# Patient Record
Sex: MALE | Race: WHITE | NOT HISPANIC OR LATINO | Employment: FULL TIME | URBAN - METROPOLITAN AREA
[De-identification: names, ages, dates, MRNs, and addresses within clinical notes are randomized per-mention and may not be internally consistent; named-entity substitution may affect disease eponyms.]

---

## 2017-03-24 RX ORDER — BACLOFEN 10 MG/1
TABLET ORAL
Qty: 90 TABLET | Refills: 0 | Status: SHIPPED | OUTPATIENT
Start: 2017-03-24 | End: 2017-04-25 | Stop reason: SDUPTHER

## 2017-03-24 RX ORDER — GABAPENTIN 600 MG/1
600 TABLET ORAL 4 TIMES DAILY
Qty: 120 TABLET | Refills: 3 | Status: SHIPPED | OUTPATIENT
Start: 2017-03-24 | End: 2017-07-12 | Stop reason: SDUPTHER

## 2017-04-25 RX ORDER — BACLOFEN 10 MG/1
TABLET ORAL
Qty: 90 TABLET | Refills: 0 | Status: SHIPPED | OUTPATIENT
Start: 2017-04-25 | End: 2017-05-26 | Stop reason: SDUPTHER

## 2017-05-30 RX ORDER — BACLOFEN 10 MG/1
TABLET ORAL
Qty: 90 TABLET | Refills: 0 | Status: SHIPPED | OUTPATIENT
Start: 2017-05-30 | End: 2017-06-29 | Stop reason: SDUPTHER

## 2017-06-29 RX ORDER — BACLOFEN 10 MG/1
TABLET ORAL
Qty: 90 TABLET | Refills: 0 | Status: SHIPPED | OUTPATIENT
Start: 2017-06-29 | End: 2017-07-29 | Stop reason: SDUPTHER

## 2017-07-12 RX ORDER — GABAPENTIN 600 MG/1
600 TABLET ORAL 4 TIMES DAILY
Qty: 120 TABLET | Refills: 3 | OUTPATIENT
Start: 2017-07-12 | End: 2018-01-05 | Stop reason: SDUPTHER

## 2017-07-31 RX ORDER — BACLOFEN 10 MG/1
TABLET ORAL
Qty: 90 TABLET | Refills: 0 | Status: SHIPPED | OUTPATIENT
Start: 2017-07-31 | End: 2017-09-01 | Stop reason: SDUPTHER

## 2017-08-07 ENCOUNTER — TELEPHONE (OUTPATIENT)
Dept: PAIN MEDICINE | Facility: CLINIC | Age: 57
End: 2017-08-07

## 2017-08-07 DIAGNOSIS — G50.0 TRIGEMINAL NEURALGIA: Primary | ICD-10-CM

## 2017-08-07 RX ORDER — OXCARBAZEPINE 150 MG/1
TABLET, FILM COATED ORAL
Qty: 120 TABLET | Refills: 3 | Status: SHIPPED | OUTPATIENT
Start: 2017-08-07 | End: 2017-12-11 | Stop reason: SDUPTHER

## 2017-08-07 NOTE — TELEPHONE ENCOUNTER
Patient states the Pharmacy tried to call today to get a refill on his oxcarbazepine that he takes for trigeminal neuralgia.

## 2017-09-01 RX ORDER — BACLOFEN 10 MG/1
TABLET ORAL
Qty: 90 TABLET | Refills: 0 | Status: SHIPPED | OUTPATIENT
Start: 2017-09-01 | End: 2017-10-04 | Stop reason: SDUPTHER

## 2017-10-05 RX ORDER — BACLOFEN 10 MG/1
TABLET ORAL
Qty: 90 TABLET | Refills: 0 | Status: SHIPPED | OUTPATIENT
Start: 2017-10-05 | End: 2018-10-17 | Stop reason: SDUPTHER

## 2017-10-12 RX ORDER — BACLOFEN 10 MG/1
TABLET ORAL
Qty: 90 TABLET | Refills: 0 | Status: SHIPPED | OUTPATIENT
Start: 2017-10-12 | End: 2017-12-04 | Stop reason: SDUPTHER

## 2017-12-04 RX ORDER — BACLOFEN 10 MG/1
TABLET ORAL
Qty: 90 TABLET | Refills: 0 | Status: SHIPPED | OUTPATIENT
Start: 2017-12-04 | End: 2018-01-05 | Stop reason: SDUPTHER

## 2017-12-11 DIAGNOSIS — G50.0 TRIGEMINAL NEURALGIA: ICD-10-CM

## 2017-12-11 RX ORDER — OXCARBAZEPINE 150 MG/1
TABLET, FILM COATED ORAL
Qty: 120 TABLET | Refills: 3 | Status: SHIPPED | OUTPATIENT
Start: 2017-12-11 | End: 2018-04-10 | Stop reason: SDUPTHER

## 2018-01-08 RX ORDER — BACLOFEN 10 MG/1
TABLET ORAL
Qty: 90 TABLET | Refills: 5 | Status: SHIPPED | OUTPATIENT
Start: 2018-01-08 | End: 2018-07-13 | Stop reason: SDUPTHER

## 2018-01-08 RX ORDER — GABAPENTIN 600 MG/1
TABLET ORAL
Qty: 120 TABLET | Refills: 5 | OUTPATIENT
Start: 2018-01-08 | End: 2018-07-13 | Stop reason: SDUPTHER

## 2018-04-10 DIAGNOSIS — G50.0 TRIGEMINAL NEURALGIA: ICD-10-CM

## 2018-04-10 RX ORDER — OXCARBAZEPINE 150 MG/1
TABLET, FILM COATED ORAL
Qty: 120 TABLET | Refills: 3 | Status: SHIPPED | OUTPATIENT
Start: 2018-04-10 | End: 2018-07-16 | Stop reason: SDUPTHER

## 2018-07-13 RX ORDER — GABAPENTIN 600 MG/1
TABLET ORAL
Qty: 120 TABLET | Refills: 5 | Status: SHIPPED | OUTPATIENT
Start: 2018-07-13 | End: 2018-10-17 | Stop reason: SDUPTHER

## 2018-07-13 RX ORDER — BACLOFEN 10 MG/1
TABLET ORAL
Qty: 90 TABLET | Refills: 5 | Status: SHIPPED | OUTPATIENT
Start: 2018-07-13 | End: 2018-10-17 | Stop reason: SDUPTHER

## 2018-07-13 NOTE — TELEPHONE ENCOUNTER
Patient called requesting refill on Gabapentin and Baclofen. Last seen 5/3/16. [facility] report# [id number] scanned into media     Per Dr. Nieto: Refill both x 1 and ask patient to get medications from PCP in future due to not being seen in more than 2 years.

## 2018-07-14 DIAGNOSIS — G50.0 TRIGEMINAL NEURALGIA: ICD-10-CM

## 2018-07-16 DIAGNOSIS — G50.0 TRIGEMINAL NEURALGIA: ICD-10-CM

## 2018-07-16 RX ORDER — OXCARBAZEPINE 150 MG/1
TABLET, FILM COATED ORAL
Qty: 120 TABLET | Refills: 3 | OUTPATIENT
Start: 2018-07-16

## 2018-07-16 RX ORDER — OXCARBAZEPINE 150 MG/1
300 TABLET, FILM COATED ORAL 2 TIMES DAILY
Qty: 120 TABLET | Refills: 0 | Status: SHIPPED | OUTPATIENT
Start: 2018-07-16 | End: 2018-09-12 | Stop reason: SDUPTHER

## 2018-07-16 RX ORDER — BACLOFEN 10 MG/1
TABLET ORAL
Qty: 90 TABLET | Refills: 5 | OUTPATIENT
Start: 2018-07-16

## 2018-07-17 RX ORDER — BACLOFEN 10 MG/1
TABLET ORAL
Qty: 90 TABLET | Refills: 5 | OUTPATIENT
Start: 2018-07-17

## 2018-09-12 DIAGNOSIS — G50.0 TRIGEMINAL NEURALGIA: ICD-10-CM

## 2018-09-12 RX ORDER — OXCARBAZEPINE 150 MG/1
TABLET, FILM COATED ORAL
Qty: 120 TABLET | Refills: 0 | Status: SHIPPED | OUTPATIENT
Start: 2018-09-12 | End: 2018-10-15 | Stop reason: SDUPTHER

## 2018-10-15 ENCOUNTER — TELEPHONE (OUTPATIENT)
Dept: PAIN MEDICINE | Facility: CLINIC | Age: 58
End: 2018-10-15

## 2018-10-15 DIAGNOSIS — G50.0 TRIGEMINAL NEURALGIA: ICD-10-CM

## 2018-10-15 RX ORDER — OXCARBAZEPINE 150 MG/1
TABLET, FILM COATED ORAL
Qty: 120 TABLET | Refills: 0 | Status: SHIPPED | OUTPATIENT
Start: 2018-10-15 | End: 2018-10-17 | Stop reason: SDUPTHER

## 2018-10-15 NOTE — TELEPHONE ENCOUNTER
----- Message from Mark Nieto MD sent at 10/15/2018  1:18 PM EDT -----  Regarding: RE: Rx  Please refill so he can have his med  ----- Message -----  From: Karlene Toscano MA  Sent: 10/15/2018  12:07 PM  To: Mark Nieto MD  Subject: Rx                                               Patient called requesting refill on Oxcarbazepine. He hasn't been seen since 5/4/16. He is scheduled for med refill with Armando on Wednesday however he is out of medication after today. He wants to know if it can be sent in prior to Wednesday. [facility] report # [id number] scanned to media

## 2018-10-16 NOTE — PROGRESS NOTES
"Chief Complaint: \"I'm here for a medication refill.  I'm doing alright.\"    History of Present Illness:   Patient: Mr. Maynor Villagomez, 57 y.o. male, who presents today for medication refill.  He takes 300 mg of oxcarbazepine twice daily for trigeminal neuralgia.  Also takes 10 mg of Baclofen tid, and 600 mg of gabapentin four times daily for trigeminal neuralgia.  He states that he is doing well with all medications, and denies any adverse effects.  Patient was last seen in this office on 05/24/2016 for trigeminal nerve RFTC and reports that he is still experiencing significant ongoing relief.  Patient voices no concerns today.      Global Pain Scale 10-          Pain 5          Feelings 2          Clinical outcomes 1          Activities 0          GPS Total: 8            I have reviewed [facility] Report #[id number], consistent to medication reconciliation    Review of Diagnostic Studies:  There are no new diagnostic studies available for review.    Review of Systems   All other systems reviewed and are negative.        Patient Active Problem List   Diagnosis   • Right trigeminal neuralgia   • Spinal stenosis in cervical region   • Bilateral carpal tunnel syndrome   • Moderate obesity       Past Medical History:   Diagnosis Date   • Depression    • Generalized erosion of teeth    • History of alcohol abuse    • History of carpal tunnel syndrome    • History of hepatitis    • History of hypotension    • History of nicotine dependence    • History of trigeminal neuralgia          Past Surgical History:   Procedure Laterality Date   • ARM WOUND REPAIR / CLOSURE Right    • MOUTH SURGERY      Tooth Extraction         Family History   Problem Relation Age of Onset   • Diabetes type II Mother    • Heart disease Father          Social History     Social History   • Marital status:      Social History Main Topics   • Smoking status: Current Every Day Smoker   • Alcohol use No   • Drug use: No     Other Topics Concern " "  • Not on file           Current Outpatient Prescriptions:   •  amLODIPine (NORVASC) 5 MG tablet, Take  by mouth., Disp: , Rfl:   •  B Complex Vitamins (VITAMIN B COMPLEX PO), Take  by mouth., Disp: , Rfl:   •  Cholecalciferol (VITAMIN D3) 2000 UNITS capsule, Take 1 capsule by mouth 2 (two) times a day., Disp: , Rfl:   •  lisinopril-hydrochlorothiazide (PRINZIDE,ZESTORETIC) 10-12.5 MG per tablet, Take 1 tablet by mouth daily., Disp: , Rfl:   •  Multiple Vitamin tablet, Take 1 tablet by mouth daily., Disp: , Rfl:   •  baclofen (LIORESAL) 10 MG tablet, Take 1 tablet by mouth 3 (Three) Times a Day., Disp: 90 tablet, Rfl: 5  •  gabapentin (NEURONTIN) 600 MG tablet, Take 1 tablet by mouth 4 (Four) Times a Day., Disp: 120 tablet, Rfl: 5  •  OXcarbazepine (TRILEPTAL) 150 MG tablet, Take 2 tablets by mouth 2 (Two) Times a Day., Disp: 120 tablet, Rfl: 5      No Known Allergies      /82   Pulse 88   Temp 97.3 °F (36.3 °C) (Temporal Artery )   Resp 18   Ht 177.8 cm (70\")   Wt 96 kg (211 lb 9.6 oz)   SpO2 93%   BMI 30.36 kg/m²      Physical Exam:  Constitutional: Patient is oriented to person, place, and time.  Patient appears well-developed and well-nourished.   HEENT: Head: Normocephalic and atraumatic. Eyes: Conjunctivae and lids are normal. Pupils: Equal, round, reactive to light.   Neck: Trachea normal. Neck supple.   Pulmonary Respiratory effort: No increased work of breathing or signs of respiratory distress. Auscultation of lungs: Clear to auscultation.   Cardiovascular Auscultation of heart: Normal rate and rhythm, normal S1 and S2, no murmurs.   Musculoskeletal   Gait and station: Gait evaluation demonstrated a normal gait  Cervical spine: Passive and active range of motion is full and without pain. Extension, flexion, lateral flexion, rotation of the cervical spine did not increase or reproduce pain. Cervical facet joint loading maneuvers are negative.   Neurological: Patient is alert and oriented to " person, place, and time. Speech: speech is normal. Cortical function: Normal mental status.   Cranial nerves: Cranial nerves 2-12 grossly intact.   Reflex Scores:  Right brachioradialis: 2+  Left brachioradialis: 2+  Right biceps: 2+  Left biceps: 2+  Right triceps: 2+  Left triceps: 2+  Right patellar: 2+  Left patellar: 2+  Right achilles: 2+  Left achilles: 2+  Motor strength: 5/5  Motor Tone: normal tone.   Involuntary movements: none.   Right ankle clonus: absent  Left ankle clonus: absent   No nuchal rigidity. Negative Kernig and Brudzinski.  Spurling sign is negative.   Sensation: No sensory loss. Sensory exam: intact to light touch, intact pain and temperature sensation, intact vibration sensation and normal proprioception.   Coordination: Normal finger to nose and heel to shin. Normal balance and negative. Romberg's sign negative.   Skin and subcutaneous tissue: Skin is warm and intact. No rash noted. No cyanosis.   Psychiatric: Judgment and insight: Normal. Orientation to person, place and time: Normal. Recent and remote memory: Intact. Mood and affect: Normal.     ASSESSMENT:   1. Right trigeminal neuralgia    2. Spinal stenosis in cervical region    3. Bilateral carpal tunnel syndrome      PLAN/MEDICAL DECISION MAKING:  I have reviewed all available medical records as well as previous therapies as referenced above, and discussed the patient with Dr. Nieto.  1. Continue gabapentin 600 mg: Take 1 tab po qid.  Refilled.  Patient was advised that he will need a medication follow-up in 6 months.  2. Continue baclofen 10 mg: Take 1 tab po tid.  Refilled.    3. Continue oxcarbazepine 150 mg: Take 2 tabs po bid.  Refilled.  4. The patient has been instructed to contact my office with any questions or difficulties. The patient understands the plan and agrees to proceed accordingly.     Patient Care Team:  Case Borden MD as PCP - General (Family Medicine)  Mark Nieto MD as Consulting Physician (Pain  Medicine)     No orders of the defined types were placed in this encounter.        No future appointments.      Armando Haque PA-C     EMR Dragon/Transcription disclaimer:  Much of this encounter note is an electronic transcription of spoken language to printed text. Electronic transcription of spoken language may permit erroneous, or at times, nonsensical words or phrases to be inadvertently transcribed. Although I have reviewed the note for such errors, some may still exist.

## 2018-10-17 ENCOUNTER — OFFICE VISIT (OUTPATIENT)
Dept: PAIN MEDICINE | Facility: CLINIC | Age: 58
End: 2018-10-17

## 2018-10-17 VITALS
OXYGEN SATURATION: 93 % | DIASTOLIC BLOOD PRESSURE: 82 MMHG | HEART RATE: 88 BPM | RESPIRATION RATE: 18 BRPM | SYSTOLIC BLOOD PRESSURE: 122 MMHG | BODY MASS INDEX: 30.29 KG/M2 | HEIGHT: 70 IN | TEMPERATURE: 97.3 F | WEIGHT: 211.6 LBS

## 2018-10-17 DIAGNOSIS — G50.0 RIGHT TRIGEMINAL NEURALGIA: Primary | ICD-10-CM

## 2018-10-17 DIAGNOSIS — G50.0 TRIGEMINAL NEURALGIA: ICD-10-CM

## 2018-10-17 DIAGNOSIS — M48.02 SPINAL STENOSIS IN CERVICAL REGION: ICD-10-CM

## 2018-10-17 DIAGNOSIS — G56.03 BILATERAL CARPAL TUNNEL SYNDROME: ICD-10-CM

## 2018-10-17 PROCEDURE — 99213 OFFICE O/P EST LOW 20 MIN: CPT | Performed by: PHYSICIAN ASSISTANT

## 2018-10-17 RX ORDER — BACLOFEN 10 MG/1
10 TABLET ORAL 3 TIMES DAILY
Qty: 90 TABLET | Refills: 5 | Status: SHIPPED | OUTPATIENT
Start: 2018-10-17 | End: 2019-05-30 | Stop reason: SDUPTHER

## 2018-10-17 RX ORDER — AMLODIPINE BESYLATE 5 MG/1
TABLET ORAL
COMMUNITY
Start: 2018-10-15 | End: 2022-04-22 | Stop reason: SDUPTHER

## 2018-10-17 RX ORDER — GABAPENTIN 600 MG/1
600 TABLET ORAL 4 TIMES DAILY
Qty: 120 TABLET | Refills: 5 | OUTPATIENT
Start: 2018-10-17 | End: 2019-05-20 | Stop reason: SDUPTHER

## 2018-10-17 RX ORDER — OXCARBAZEPINE 150 MG/1
300 TABLET, FILM COATED ORAL 2 TIMES DAILY
Qty: 120 TABLET | Refills: 5 | Status: SHIPPED | OUTPATIENT
Start: 2018-10-17 | End: 2019-05-13 | Stop reason: SDUPTHER

## 2019-05-09 DIAGNOSIS — G50.0 TRIGEMINAL NEURALGIA: ICD-10-CM

## 2019-05-10 RX ORDER — OXCARBAZEPINE 150 MG/1
TABLET, FILM COATED ORAL
Qty: 340 TABLET | Refills: 2 | OUTPATIENT
Start: 2019-05-10

## 2019-05-13 DIAGNOSIS — G50.0 TRIGEMINAL NEURALGIA: ICD-10-CM

## 2019-05-13 RX ORDER — OXCARBAZEPINE 150 MG/1
300 TABLET, FILM COATED ORAL 2 TIMES DAILY
Qty: 120 TABLET | Refills: 0 | Status: SHIPPED | OUTPATIENT
Start: 2019-05-13 | End: 2019-05-30 | Stop reason: SDUPTHER

## 2019-05-13 NOTE — TELEPHONE ENCOUNTER
Patient called requesting refill on Oxcarbazepine. Patient last seen 10/17/2018. Called patient to schedule appointment for med refill however he can't come in for 2 weeks. Sent 30 day supply to [facility] and scheduled patient an appointment on 05/30/19.

## 2019-05-20 RX ORDER — GABAPENTIN 600 MG/1
TABLET ORAL
Qty: 120 TABLET | Refills: 5 | OUTPATIENT
Start: 2019-05-20

## 2019-05-20 RX ORDER — GABAPENTIN 600 MG/1
600 TABLET ORAL 4 TIMES DAILY
Qty: 40 TABLET | Refills: 0 | OUTPATIENT
Start: 2019-05-20 | End: 2019-05-30 | Stop reason: SDUPTHER

## 2019-05-20 NOTE — TELEPHONE ENCOUNTER
Rx called in to [facility]    ----- Message from Billie Dickerson RN sent at 5/20/2019  4:08 PM EDT -----      ----- Message -----  From: Mark Nieto MD  Sent: 5/20/2019   4:05 PM  To: Billie Dickerson RN    Sure. Please call some until he sees Armando  ----- Message -----  From: Billie Dickerson RN  Sent: 5/20/2019   4:03 PM  To: Mark Nieto MD    Patient called about a gabapentin refill. He is scheduled for an OV 5/30.     He is requesting a refill. He last refilled 02/19/2019- Pharmacy confirmed that fill.     He does admit to forgetting to take the medication sometimes. Ordered 600mg QID     Do you want to call any in for him until the the 30th?

## 2019-05-29 NOTE — PROGRESS NOTES
"Chief Complaint: \"Medication refill.\"    History of Present Illness:   Patient: Mr. Maynor Villagomez, 58 y.o. male, who presents today for medication refill.  He takes 300 mg of oxcarbazepine twice daily, 10 mg of Baclofen tid, and 600 mg of gabapentin four times daily for trigeminal neuralgia.  Patient states that he is doing well with the medications, and denies any adverse effects.  Pain level today is 2/10.  Patient was previously seen on 05/24/2016 for trigeminal nerve RFTC and reports that he is still experiencing significant ongoing relief.        Global Pain Scale 10-17-18 05-30-19         Pain 5 6         Feelings 2 2         Clinical outcomes 1 1         Activities 0 0         GPS Total: 8 9           I have reviewed [facility] Report #[id number], consistent to medication reconciliation    Review of Diagnostic Studies:  There are no new diagnostic studies available for review.    Review of Systems   All other systems reviewed and are negative.        Patient Active Problem List   Diagnosis   • Right trigeminal neuralgia   • Spinal stenosis in cervical region   • Bilateral carpal tunnel syndrome   • Moderate obesity       Past Medical History:   Diagnosis Date   • Depression    • Generalized erosion of teeth    • History of alcohol abuse    • History of carpal tunnel syndrome    • History of hepatitis    • History of hypotension    • History of nicotine dependence    • History of trigeminal neuralgia          Past Surgical History:   Procedure Laterality Date   • ARM WOUND REPAIR / CLOSURE Right    • MOUTH SURGERY      Tooth Extraction         Family History   Problem Relation Age of Onset   • Diabetes type II Mother    • Heart disease Father          Social History     Socioeconomic History   • Marital status:      Spouse name: Not on file   • Number of children: Not on file   • Years of education: Not on file   • Highest education level: Not on file   Tobacco Use   • Smoking status: Current Every Day Smoker " "  Substance and Sexual Activity   • Alcohol use: No   • Drug use: No           Current Outpatient Medications:   •  amLODIPine (NORVASC) 5 MG tablet, Take  by mouth., Disp: , Rfl:   •  B Complex Vitamins (VITAMIN B COMPLEX PO), Take  by mouth., Disp: , Rfl:   •  Cholecalciferol (VITAMIN D3) 2000 UNITS capsule, Take 1 capsule by mouth 2 (two) times a day., Disp: , Rfl:   •  gabapentin (NEURONTIN) 600 MG tablet, Take 1 tablet by mouth 4 (Four) Times a Day., Disp: 40 tablet, Rfl: 0  •  lisinopril-hydrochlorothiazide (PRINZIDE,ZESTORETIC) 10-12.5 MG per tablet, Take 1 tablet by mouth daily., Disp: , Rfl:   •  Multiple Vitamin tablet, Take 1 tablet by mouth daily., Disp: , Rfl:   •  varenicline (CHANTIX CONTINUING MONTH VAL) 1 MG tablet, Take 1 tablet by mouth Every 12 (Twelve) Hours., Disp: , Rfl:   •  baclofen (LIORESAL) 10 MG tablet, Take 1 tablet by mouth 3 (Three) Times a Day., Disp: 90 tablet, Rfl: 6  •  OXcarbazepine (TRILEPTAL) 150 MG tablet, Take 2 tablets by mouth 2 (Two) Times a Day., Disp: 120 tablet, Rfl: 6      No Known Allergies      /76 (BP Location: Left arm, Patient Position: Sitting)   Pulse 84   Temp 98.1 °F (36.7 °C) (Temporal)   Ht 177.8 cm (70\")   Wt 90.7 kg (200 lb)   SpO2 93%   BMI 28.70 kg/m²      Physical Exam:  Constitutional: Patient is oriented to person, place, and time.  Appears well-developed and well-nourished.   Head: Normocephalic and atraumatic. Eyes: Conjunctivae and lids are normal. Pupils: Equal, round, and reactive to light.   Neck: Trachea normal. Neck supple.   Pulmonary: No increased work of breathing or signs of respiratory distress.  Clear to auscultation bilaterally.   Cardiovascular: Normal rate and rhythm, normal S1 and S2, no murmurs.   Musculoskeletal   Gait and station: Normal  Cervical spine: Passive and active range of motion is full and without pain. Extension, flexion, lateral flexion, rotation of the cervical spine did not increase or reproduce pain. " Cervical facet joint loading maneuvers are negative.   Neurological: Patient is alert and oriented to person, place, and time. Speech: speech is normal. Cortical function: Normal mental status.   Cranial nerves: Cranial nerves 2-12 grossly intact.   Reflex Scores:  brachioradialis: 2+ bilaterally  biceps: 2+ bilaterally  triceps: 2+ bilaterally  patellar: 2+ bilaterally  Achilles: 2+ bilaterally  Motor strength: 5/5  Motor Tone: normal tone.   Involuntary movements: none.   Right ankle clonus: absent  Left ankle clonus: absent   No nuchal rigidity. Negative Kernig and Brudzinski.  Spurling sign is negative.   Sensation: No sensory loss. Sensory exam: intact to light touch, intact pain and temperature sensation, intact vibration sensation and normal proprioception.   Coordination: Normal finger to nose and heel to shin. Normal balance and negative. Romberg's sign negative.   Skin and subcutaneous tissue: Skin is warm and intact. No rash noted. No cyanosis.   Psychiatric: Judgment and insight: Normal. Orientation to person, place and time: Normal. Recent and remote memory: Intact. Mood and affect: Normal.     ASSESSMENT:   1. Right trigeminal neuralgia    2. Spinal stenosis in cervical region    3. Bilateral carpal tunnel syndrome      PLAN/MEDICAL DECISION MAKING:  I have reviewed all available medical records as well as previous therapies as referenced above, and discussed the patient with Dr. Nieto.  1. Continue gabapentin 600 mg: Take 1 tab po qid.  Refilled.    2. Continue baclofen 10 mg: Take 1 tab po tid.  Refilled.    3. Continue oxcarbazepine 150 mg: Take 2 tabs po bid.  Refilled.  4. The patient has been instructed to contact my office with any questions or difficulties. The patient understands the plan and agrees to proceed accordingly.     Patient Care Team:  Case Borden MD as PCP - General (Family Medicine)  Mark Nieto MD as Consulting Physician (Pain Medicine)     No orders of the defined  types were placed in this encounter.        Future Appointments   Date Time Provider Department Center   11/25/2019  3:00 PM Armando Haque PA-C [facility] None         Armando Haque PA-C     EMR Dragon/Transcription disclaimer:  Much of this encounter note is an electronic transcription of spoken language to printed text. Electronic transcription of spoken language may permit erroneous, or at times, nonsensical words or phrases to be inadvertently transcribed. Although I have reviewed the note for such errors, some may still exist.

## 2019-05-30 ENCOUNTER — OFFICE VISIT (OUTPATIENT)
Dept: PAIN MEDICINE | Facility: CLINIC | Age: 59
End: 2019-05-30

## 2019-05-30 VITALS
OXYGEN SATURATION: 93 % | DIASTOLIC BLOOD PRESSURE: 76 MMHG | SYSTOLIC BLOOD PRESSURE: 124 MMHG | TEMPERATURE: 98.1 F | HEIGHT: 70 IN | WEIGHT: 200 LBS | HEART RATE: 84 BPM | BODY MASS INDEX: 28.63 KG/M2

## 2019-05-30 DIAGNOSIS — G50.0 RIGHT TRIGEMINAL NEURALGIA: Primary | ICD-10-CM

## 2019-05-30 DIAGNOSIS — G56.03 BILATERAL CARPAL TUNNEL SYNDROME: ICD-10-CM

## 2019-05-30 DIAGNOSIS — G50.0 TRIGEMINAL NEURALGIA: ICD-10-CM

## 2019-05-30 DIAGNOSIS — M48.02 SPINAL STENOSIS IN CERVICAL REGION: ICD-10-CM

## 2019-05-30 PROCEDURE — 99213 OFFICE O/P EST LOW 20 MIN: CPT | Performed by: PHYSICIAN ASSISTANT

## 2019-05-30 RX ORDER — VARENICLINE TARTRATE 1 MG/1
1 TABLET, FILM COATED ORAL EVERY 12 HOURS
COMMUNITY
Start: 2019-05-09 | End: 2019-12-12

## 2019-05-30 RX ORDER — GABAPENTIN 600 MG/1
600 TABLET ORAL 4 TIMES DAILY
Qty: 120 TABLET | Refills: 5 | OUTPATIENT
Start: 2019-05-30 | End: 2019-12-12 | Stop reason: SDUPTHER

## 2019-05-30 RX ORDER — GABAPENTIN 600 MG/1
600 TABLET ORAL 4 TIMES DAILY
Qty: 120 TABLET | Refills: 6 | Status: CANCELLED | OUTPATIENT
Start: 2019-05-30

## 2019-05-30 RX ORDER — OXCARBAZEPINE 150 MG/1
300 TABLET, FILM COATED ORAL 2 TIMES DAILY
Qty: 120 TABLET | Refills: 6 | Status: SHIPPED | OUTPATIENT
Start: 2019-05-30 | End: 2019-12-12 | Stop reason: SDUPTHER

## 2019-05-30 RX ORDER — BACLOFEN 10 MG/1
10 TABLET ORAL 3 TIMES DAILY
Qty: 90 TABLET | Refills: 6 | Status: SHIPPED | OUTPATIENT
Start: 2019-05-30 | End: 2019-12-12 | Stop reason: SDUPTHER

## 2019-12-11 NOTE — PROGRESS NOTES
"Chief Complaint: \"I have done significantly well with my previous trigeminal nerve ablations, the pain is returning\"       History of Present Illness:   Patient: Mr. Maynor Villagomez, 59 y.o. male,  returns for evaluation of trigeminal neuralgia, patient underwent right trigeminal nerve radiofrequency ablation on May 4, 2016, from which he reports 3 1/2 years of 100% pain relief, the pain began returning 1 month ago.  Prior to 2016 he underwent a right trigeminal nerve radiofrequency ablation that provided him with 5 years of complete resolution of his trigeminal neuralgia pain.  He was last seen for follow-up evaluation and medication refill on May 30, 2019 by Armando Haque PA-C, and at the time of his follow-up visit he was still experiencing significant ongoing relief of his trigeminal neuralgia, from previous trigeminal nerve radiofrequency ablation in 2016.  He states the neck and upper extremity pain and numbness have resolved since his cervical epidural steroid injection. He does have severe trigeminal pain intermittently at this time.  He continues to take 300 mg of oxcarbazepine twice daily, 10 mg of Baclofen tid, and 600 mg of gabapentin four times daily for trigeminal neuralgia, he denies any adverse effects, he has remained on this medication for several years with significant results and relief of his trigeminal neuralgia. He reports no significant changes in his medical history since he was seen last seen.  Pain intensity today: 7/10  Average pain intensity: 7/10  Description of pain:  Location of pain: right-sided unilateral predominantly on the second and third division of the right trigeminal nerve  Radiation of pain :none  Character of pain:crushing, sharp, shooting, stabbing and throbbing  Severity of pain: 10/10 during his pain attacks, decreasing to 0/10 during his pain-free intervals.  Duration of pain attacks: From a few seconds up to a 10 seconds  Accompanying symptoms: None  Triggering factors: " Brushing his teeth, touching his face, talking, chewing, washing his face   Rapidity of onset: sudden  Are most pain episodes similar in presentation? yes  Started having trigeminal neuralgia 10 years ago  Worst time of day: All day long, particularly depending upon triggering factors   Awaken from sleep?: yes  Seasonal pattern?: no  Overall pattern since problem began: Pain resolved after right trigeminal nerve radiofrequency ablation for more than 5 years, and most recently more than 3 years.   Degree of Functional Impairment: severe  Current Use of Meds to Treat his trigeminal neuralgia: Gabapentin 600 mg 4 times a day, Trileptal 150 mg 2 tablets twice a day, baclofen 10 mg 3 times a day     Review of previous therapies and additional medical records:  Interventional measures: 05/04/2016: Right trigeminal nerve RFTC  03/30/2016: Cervical epidural steroid injection, C6-C7 interlaminar approach  06/15/2011: Right trigeminal nerve RFTC (experienced complete resolution for 5 years)  12/04/2013: Bilateral carpal tunnel injections  Mr. Maynor Villagomez, 59 y.o. male  presents with significant comorbidities including nicotine addiction,  not engaged in treatment., hypertension and hyperlipidemia engaged in treatment.  In terms of current analgesics,Mr. Maynor Villagomez, 59 y.o. male takes: gabapentin, Trileptal, Baclofen.  I have reviewed Willie Report #[id number] consistent to medication reconciliation.    Global Pain Scale 10-17-  2018 05-30-  2019 12-12-  2019        Pain 5 6 16        Feelings 2 2 0        Clinical outcomes 1 1 0        Activities 0 0 0        GPS Total: 8 9 16          Review of Diagnostic Studies:  There are no new diagnostic studies available for review.    Review of Systems   All other systems reviewed and are negative.        Patient Active Problem List   Diagnosis   • Right trigeminal neuralgia   • Spinal stenosis in cervical region   • Bilateral carpal tunnel syndrome   • Moderate obesity       Past  "Medical History:   Diagnosis Date   • Depression    • Generalized erosion of teeth    • History of alcohol abuse    • History of carpal tunnel syndrome    • History of hepatitis    • History of hypotension    • History of nicotine dependence    • History of trigeminal neuralgia          Past Surgical History:   Procedure Laterality Date   • ARM WOUND REPAIR / CLOSURE Right    • MOUTH SURGERY      Tooth Extraction         Family History   Problem Relation Age of Onset   • Diabetes type II Mother    • Heart disease Father          Social History     Socioeconomic History   • Marital status:      Spouse name: Not on file   • Number of children: Not on file   • Years of education: Not on file   • Highest education level: Not on file   Tobacco Use   • Smoking status: Current Every Day Smoker   Substance and Sexual Activity   • Alcohol use: No   • Drug use: No           Current Outpatient Medications:   •  amLODIPine (NORVASC) 5 MG tablet, Take  by mouth., Disp: , Rfl:   •  B Complex Vitamins (VITAMIN B COMPLEX PO), Take  by mouth., Disp: , Rfl:   •  baclofen (LIORESAL) 10 MG tablet, Take 1 tablet by mouth 3 (Three) Times a Day., Disp: 90 tablet, Rfl: 6  •  Cholecalciferol (VITAMIN D3) 2000 UNITS capsule, Take 1 capsule by mouth 2 (two) times a day., Disp: , Rfl:   •  gabapentin (NEURONTIN) 600 MG tablet, Take 1 tablet by mouth 4 (Four) Times a Day., Disp: 120 tablet, Rfl: 5  •  lisinopril-hydrochlorothiazide (PRINZIDE,ZESTORETIC) 10-12.5 MG per tablet, Take 1 tablet by mouth daily., Disp: , Rfl:   •  Multiple Vitamin tablet, Take 1 tablet by mouth daily., Disp: , Rfl:   •  OXcarbazepine (TRILEPTAL) 150 MG tablet, Take 2 tablets by mouth 2 (Two) Times a Day., Disp: 120 tablet, Rfl: 6      Allergies   Allergen Reactions   • Cephalexin Anaphylaxis   • Penicillins Anaphylaxis         /78   Pulse 96   Temp 97.8 °F (36.6 °C) (Temporal)   Resp 18   Ht 177.8 cm (70\")   Wt 94.7 kg (208 lb 12.8 oz)   SpO2 97%   " BMI 29.96 kg/m²      Physical Exam:  Constitutional: Patient is oriented to person, place, and time.   Patient appears well-developed and well-nourished.   HEENT: Head: Normocephalic and atraumatic.   Eyes: Conjunctivae and lids are normal.   Pupils: Equal, round, reactive to light.   TMJ: Mandibular opening slightly decreased. No grinding, popping, crepitus, or clicking was noted.  Palpation of the joint in the ear canal while the patient opens and closes the mandible reveals normal condylar movement without tenderness, but does elicit pain from trigeminal nerve on the right side.   Neck: Trachea normal. Neck supple. No JVD present.   Lymphatic: No cervical adenopathy  Pulmonary Respiratory effort: No increased work of breathing or signs of respiratory distress. Auscultation of lungs: Clear to auscultation.   Cardiovascular Auscultation of heart: Normal rate and rhythm, normal S1 and S2, no murmurs.   Peripheral vascular exam:  Femoral: right 2+, left 2+. Posterior tibialis: right 2+ and left 2+. Dorsalis pedis: right 2+ and left 2+. No edema.   Abdomen: The abdomen was soft and nontender. Bowel sounds were normal.   Lymphatic: Right: No inguinal adenopathy present. Left: No inguinal adenopathy present.    Musculoskeletal   Gait and station: Gait evaluation demonstrated a normal gait   Cervical spine:   Passive and active range of motion are full and without pain. Extension, flexion, lateral flexion, rotation of the cervical spine did not increase or reproduce pain. Cervical facet joint loading maneuvers are negative.   Muscles: Presence of active trigger points levator scapulae   Shoulders: The range of motion of the glenohumeral joints is full and without pain. Rotator cuff strength is 5/5.   Neurological:   Patient is alert and oriented to person, place, and time.   Speech: speech is normal.   Cortical function: Normal mental status.   Cranial nerves: Cranial nerves 2-12 intact.   Reflex Scores:  Right  brachioradialis: 2+  Left brachioradialis: 2+  Right biceps: 2+  Left biceps: 2+  Right triceps: 2+  Left triceps: 2+  Right patellar: 2+  Left patellar: 2+  Right Achilles: 2+  Left Achilles: 2+  Motor strength: 5/5  Motor Tone: normal tone.   Involuntary movements: none.   Superficial/Primitive Reflexes: primitive reflexes were absent.   Right Carrillo: absent  Left Carrillo: absent  Right ankle clonus: absent  Left ankle clonus: absent   Spurling sign is negative. Neck tornado test is negative. Lhermitte sign is negative. Negative long tract signs. Straight leg raising test is negative. Femoral stretch sign is negative.   Sensation: No sensory loss. Sensory exam: intact to light touch, intact pain and temperature sensation, intact vibration sensation and normal proprioception.   There is hyperesthesia, hyperalgesia and allodynia in the territory of the second and third division of the right trigeminal nerve   Coordination: Normal finger to nose and heel to shin. Normal balance and  negative Romberg's sign   Skin and subcutaneous tissue: Skin is warm and intact. No rash noted. No cyanosis.   Psychiatric: Judgment and insight: Normal. Orientation to person, place and time: Normal. Recent and remote memory: Intact. Mood and affect: Normal.       ASSESSMENT:   1. Right trigeminal neuralgia    2. Spinal stenosis in cervical region    3. Bilateral carpal tunnel syndrome    4. Moderate obesity    5. Trigeminal neuralgia    6. Current every day smoker    7. Tobacco abuse counseling           PLAN/MEDICAL DECISION MAKING: I had a lengthy conversation with Mr. Villagomez and his wife regarding his severe and recurrent right trigeminal neuralgia.  He continues to have significant pain relief in his trigeminal neuralgia with interventional pain management measures as referenced above, and medication therapy. Refills were provided as appropriate. I have reviewed all available medical records as well as previous therapies as  referenced above, and discussed the patient with Dr. Nieto. Therefore I have proposed the following plan:  1.  Pharmacological measures, as follows:   A. Continue gabapentin 600 mg: Take 1 tab po qid.     B. Continue baclofen 10 mg: Take 1 tab po tid.     C. Continue oxcarbazepine 150 mg: Take 2 tabs po bid.    D. Continue vitamin B complex B1, B2, B6, B12 and folic acid   2.  Interventional pain management measures: Patient will be scheduled for right trigeminal nerve radiofrequency ablation.  3.  Long-term rehabilitation efforts:  A. The patient does not have a history of falls. I did complete a risk assessment for falls.   B. Patient has declined a comprehensive physical therapy program   C. Patient has declined a referral to Dr. Rusty Valencia for cognitive behavioral therapy, biofeedback and individual therapy for smoking cessation.  D. Patient has been screened for tobacco use: Current tobacco user 2 PPD. Smoking Cessation: I have advised the patient at length regarding the long-term deleterious effects of smoking and have provided the patient lifestyle modification strategies to facilitate smoking cessation. For instance, I have instructed the patient to delay the time that he lights his first cigarette in the morning from 1 hour to 2 hours and to continue pushing back 30-60 minutes, if possible, every day for the rest of the week. We have also discussed pharmacological interventions in addition to participation in support groups, individual counseling, to name a few to facilitate smoking/nicotine cessation. I spent approximately 3 minutes advising the patient.  He has attempted to quit smoking with Chantix and nicotine patches, he has declined referral to a psychologist or other individual therapy programs to assist him to quit smoking.  He expresses no desire at this time to quit smoking.  4. The patient has been instructed to contact my office with any questions or difficulties. The patient understands  the plan and agrees to proceed accordingly.       Patient Care Team:  Case Borden MD as PCP - General (Family Medicine)  Mark Nieto MD as Consulting Physician (Pain Medicine)  Maynor Fregoso MD as Consulting Physician (Urology)     New Medications Ordered This Visit   Medications   • baclofen (LIORESAL) 10 MG tablet     Sig: Take 1 tablet by mouth 3 (Three) Times a Day.     Dispense:  90 tablet     Refill:  6     Please consider 90 day supplies to promote better adherence   • OXcarbazepine (TRILEPTAL) 150 MG tablet     Sig: Take 2 tablets by mouth 2 (Two) Times a Day.     Dispense:  120 tablet     Refill:  6     Please consider 90 day supplies to promote better adherence         Future Appointments   Date Time Provider Department Center   1/8/2020 11:30 AM Mark Nieto MD [facility] None         ALONSO Horvath     EMR Dragon/Transcription disclaimer:  Much of this encounter note is an electronic transcription of spoken language to printed text. Electronic transcription of spoken language may permit erroneous, or at times, nonsensical words or phrases to be inadvertently transcribed. Although I have reviewed the note for such errors, some may still exist.

## 2019-12-12 ENCOUNTER — OFFICE VISIT (OUTPATIENT)
Dept: PAIN MEDICINE | Facility: CLINIC | Age: 59
End: 2019-12-12

## 2019-12-12 VITALS
TEMPERATURE: 97.8 F | OXYGEN SATURATION: 97 % | DIASTOLIC BLOOD PRESSURE: 78 MMHG | WEIGHT: 208.8 LBS | SYSTOLIC BLOOD PRESSURE: 124 MMHG | BODY MASS INDEX: 29.89 KG/M2 | RESPIRATION RATE: 18 BRPM | HEIGHT: 70 IN | HEART RATE: 96 BPM

## 2019-12-12 DIAGNOSIS — Z71.6 TOBACCO ABUSE COUNSELING: ICD-10-CM

## 2019-12-12 DIAGNOSIS — E66.8 MODERATE OBESITY: ICD-10-CM

## 2019-12-12 DIAGNOSIS — M48.02 SPINAL STENOSIS IN CERVICAL REGION: ICD-10-CM

## 2019-12-12 DIAGNOSIS — G50.0 RIGHT TRIGEMINAL NEURALGIA: Primary | ICD-10-CM

## 2019-12-12 DIAGNOSIS — G50.0 TRIGEMINAL NEURALGIA: ICD-10-CM

## 2019-12-12 DIAGNOSIS — G56.03 BILATERAL CARPAL TUNNEL SYNDROME: ICD-10-CM

## 2019-12-12 DIAGNOSIS — G50.0 RIGHT TRIGEMINAL NEURALGIA: ICD-10-CM

## 2019-12-12 DIAGNOSIS — F17.200 CURRENT EVERY DAY SMOKER: ICD-10-CM

## 2019-12-12 PROCEDURE — 99214 OFFICE O/P EST MOD 30 MIN: CPT | Performed by: NURSE PRACTITIONER

## 2019-12-12 RX ORDER — BACLOFEN 10 MG/1
10 TABLET ORAL 3 TIMES DAILY
Qty: 90 TABLET | Refills: 6 | Status: SHIPPED | OUTPATIENT
Start: 2019-12-12 | End: 2020-07-02 | Stop reason: SDUPTHER

## 2019-12-12 RX ORDER — OXCARBAZEPINE 150 MG/1
300 TABLET, FILM COATED ORAL 2 TIMES DAILY
Qty: 120 TABLET | Refills: 6 | Status: SHIPPED | OUTPATIENT
Start: 2019-12-12 | End: 2020-07-02 | Stop reason: SDUPTHER

## 2019-12-12 RX ORDER — GABAPENTIN 600 MG/1
600 TABLET ORAL 4 TIMES DAILY
Qty: 120 TABLET | Refills: 5 | OUTPATIENT
Start: 2019-12-12 | End: 2020-07-06

## 2020-01-08 ENCOUNTER — OUTSIDE FACILITY SERVICE (OUTPATIENT)
Dept: PAIN MEDICINE | Facility: CLINIC | Age: 60
End: 2020-01-08

## 2020-01-08 PROCEDURE — 77002 NEEDLE LOCALIZATION BY XRAY: CPT | Performed by: ANESTHESIOLOGY

## 2020-01-08 PROCEDURE — 99152 MOD SED SAME PHYS/QHP 5/>YRS: CPT | Performed by: ANESTHESIOLOGY

## 2020-01-08 PROCEDURE — 64605 INJECTION TREATMENT OF NERVE: CPT | Performed by: ANESTHESIOLOGY

## 2020-01-09 ENCOUNTER — TELEPHONE (OUTPATIENT)
Dept: PAIN MEDICINE | Facility: CLINIC | Age: 60
End: 2020-01-09

## 2020-01-09 NOTE — TELEPHONE ENCOUNTER
"Spoke with patient he reports that he is \"doing good\" and has \"high spirits\".   Patient reports functional improvement to include eating.   Reminded patient of appointment and appointment card sent   "

## 2020-07-02 DIAGNOSIS — G50.0 TRIGEMINAL NEURALGIA: ICD-10-CM

## 2020-07-02 DIAGNOSIS — G50.0 RIGHT TRIGEMINAL NEURALGIA: Primary | ICD-10-CM

## 2020-07-02 NOTE — TELEPHONE ENCOUNTER
Request # : [id number]    01/23/2020 Gabapentin 600MG 1960 120 30 [place] [facility] 10-  0720  [facility] 3  02/23/2020 [facility]apentin 600MG 1960 120 [facility] [place] [facility] 10-  0720  [facility] 3  03/26/2020 Gabapentin 600MG 1960 120 30 [facility],[place] [facility] 10-  0720  [facility] 3  04/27/2020 [facility]ap[facility] 600MG 1960 120 [facility] 10-  0720  [facility] 3  06/02/2020 Gabapentin 600MG 1960 120 30 [facility],[place] [facility] 10-  0720  [facility] 3

## 2020-07-06 RX ORDER — GABAPENTIN 600 MG/1
TABLET ORAL
Qty: 120 TABLET | Refills: 0 | Status: SHIPPED | OUTPATIENT
Start: 2020-07-06 | End: 2020-07-08 | Stop reason: SDUPTHER

## 2020-07-06 RX ORDER — BACLOFEN 10 MG/1
10 TABLET ORAL 3 TIMES DAILY
Qty: 90 TABLET | Refills: 0 | Status: SHIPPED | OUTPATIENT
Start: 2020-07-06 | End: 2020-07-08 | Stop reason: SDUPTHER

## 2020-07-06 RX ORDER — OXCARBAZEPINE 150 MG/1
300 TABLET, FILM COATED ORAL 2 TIMES DAILY
Qty: 120 TABLET | Refills: 0 | Status: SHIPPED | OUTPATIENT
Start: 2020-07-06 | End: 2020-07-08 | Stop reason: SDUPTHER

## 2020-07-06 NOTE — PROGRESS NOTES
"Chief Complaint: \"I am doing fairly well since my trigeminal ablation. I have good days and bad days.\"       History of Present Illness:   Patient: Mr. Maynor Villagomez, 59 y.o. male,  returns for evaluation of trigeminal neuralgia, patient underwent right trigeminal nerve radiofrequency ablation on January 8, 2020, from which he reports experiencing 60% pain relief and functional improvement that is ongoing. He previously underwent right trigeminal nerve radiofrequency ablation on May 4, 2016 that provided him with 3 1/2 years of 100% pain relief. He states the neck and upper extremity pain and numbness have resolved since his cervical epidural steroid injection. He does have intermittent trigeminal pain at this time, that can be severe.  He continues to take 300 mg of oxcarbazepine twice daily, 10 mg of Baclofen TID, and 600 mg of gabapentin four times daily for trigeminal neuralgia, he denies any adverse effects, he has remained on this medication for several years with significant results and relief of his trigeminal neuralgia. Although, he does reports at times he will miss a dose of his gabapentin due to forgetfulness. He reports no significant changes in his medical history since he was seen last seen.  Pain intensity today: 4/10  Average pain intensity: 4/10  Description of pain:  Location of pain: right-sided unilateral predominantly on the second and third division of the right trigeminal nerve  Radiation of pain :none  Character of pain:crushing, sharp, shooting, stabbing and throbbing  Severity of pain: 10/10 during his pain attacks, decreasing to 0/10 during his pain-free intervals.  Duration of pain attacks: From a few seconds up to a 10 seconds  Accompanying symptoms: None  Triggering factors: Brushing his teeth, touching his face, talking, chewing, washing his face   Rapidity of onset: sudden  Are most pain episodes similar in presentation? yes  Started having trigeminal neuralgia 10 years ago  Worst " time of day: All day long, particularly depending upon triggering factors   Awaken from sleep?: yes  Seasonal pattern?: no  Overall pattern since problem began: Pain resolved after right trigeminal nerve radiofrequency ablation for more than 5 years, and most recently more than 3 years.   Degree of Functional Impairment: severe  Current Use of Meds to Treat his trigeminal neuralgia: Gabapentin 600 mg 4 times a day, Trileptal 150 mg 2 tablets twice a day, baclofen 10 mg 3 times a day       Review of previous therapies and additional medical records:  Interventional measures: 01/08/2020: Right trigeminal nerve RFTC  05/04/2016: Right trigeminal nerve RFTC  03/30/2016: Cervical epidural steroid injection, C6-C7 interlaminar approach  06/15/2011: Right trigeminal nerve RFTC (experienced complete resolution for 5 years)  12/04/2013: Bilateral carpal tunnel injections  Mr. Maynor Villagomez, 59 y.o. male  presents with significant comorbidities including nicotine addiction,  not engaged in treatment., hypertension and hyperlipidemia engaged in treatment.  In terms of current analgesics,Mr. Maynor Villagomez, 59 y.o. male takes: gabapentin, Trileptal, Baclofen.  I have reviewed Willie Report #[id number] consistent to medication reconciliation.    Global Pain Scale 10-17-  2018 05-30-  2019 12-12-  2019 07-08  2020       Pain 5 6 16 8       Feelings 2 2 0 0       Clinical outcomes 1 1 0 0       Activities 0 0 0 0       GPS Total: 8 9 16 8         Review of Diagnostic Studies:  There are no new diagnostic studies available for review.    Review of Systems   All other systems reviewed and are negative.        Patient Active Problem List   Diagnosis   • Right trigeminal neuralgia   • Spinal stenosis in cervical region   • Bilateral carpal tunnel syndrome   • Moderate obesity       Past Medical History:   Diagnosis Date   • Depression    • Generalized erosion of teeth    • History of alcohol abuse    • History of carpal tunnel syndrome   "  • History of hepatitis    • History of hypotension    • History of nicotine dependence    • History of trigeminal neuralgia          Past Surgical History:   Procedure Laterality Date   • ARM WOUND REPAIR / CLOSURE Right    • MOUTH SURGERY      Tooth Extraction         Family History   Problem Relation Age of Onset   • Diabetes type II Mother    • Heart disease Father          Social History     Socioeconomic History   • Marital status:      Spouse name: Not on file   • Number of children: Not on file   • Years of education: Not on file   • Highest education level: Not on file   Tobacco Use   • Smoking status: Current Every Day Smoker   Substance and Sexual Activity   • Alcohol use: No   • Drug use: No           Current Outpatient Medications:   •  albuterol sulfate HFA (ProAir HFA) 108 (90 Base) MCG/ACT inhaler, inhale 2 puff by inhalation route  every 4 - 6 hours as needed, Disp: , Rfl:   •  amLODIPine (NORVASC) 5 MG tablet, Take  by mouth., Disp: , Rfl:   •  B Complex Vitamins (VITAMIN B COMPLEX PO), Take  by mouth., Disp: , Rfl:   •  baclofen (LIORESAL) 10 MG tablet, Take 1 tablet by mouth 3 (Three) Times a Day., Disp: 90 tablet, Rfl: 5  •  Cholecalciferol (VITAMIN D3) 2000 UNITS capsule, Take 1 capsule by mouth 2 (two) times a day., Disp: , Rfl:   •  gabapentin (NEURONTIN) 600 MG tablet, Take one tablet BID, two tablets QHS, Disp: 120 tablet, Rfl: 1  •  lisinopril-hydrochlorothiazide (PRINZIDE,ZESTORETIC) 10-12.5 MG per tablet, Take 1 tablet by mouth daily., Disp: , Rfl:   •  Multiple Vitamin tablet, Take 1 tablet by mouth daily., Disp: , Rfl:   •  OXcarbazepine (TRILEPTAL) 150 MG tablet, Take 2 tablets by mouth 2 (Two) Times a Day., Disp: 120 tablet, Rfl: 5      Allergies   Allergen Reactions   • Cephalexin Anaphylaxis   • Penicillins Anaphylaxis         /70 (BP Location: Left arm, Patient Position: Sitting)   Pulse 74   Temp 98.7 °F (37.1 °C)   Resp 16   Ht 177.8 cm (70\")   Wt 90.5 kg (199 " lb 8 oz)   SpO2 96%   BMI 28.63 kg/m²      Physical Exam:  Constitutional: Patient is oriented to person, place, and time.   Patient appears well-developed and well-nourished.   HEENT: Head: Normocephalic and atraumatic.   Eyes: Conjunctivae and lids are normal.   Pupils: Equal, round, reactive to light.   TMJ: Mandibular opening slightly decreased. No grinding, popping, crepitus, or clicking was noted. Palpation of the joint in the ear canal while the patient opens and closes the mandible reveals normal condylar movement without tenderness, but does elicit pain from trigeminal nerve on the right side.   Neck: Trachea normal. Neck supple. No JVD present.   Lymphatic: No cervical adenopathy  Pulmonary Respiratory effort: No increased work of breathing or signs of respiratory distress. Auscultation of lungs: Clear to auscultation.   Cardiovascular Auscultation of heart: Normal rate and rhythm, normal S1 and S2, no murmurs.   Peripheral vascular exam:  Femoral: right 2+, left 2+. Posterior tibialis: right 2+ and left 2+. Dorsalis pedis: right 2+ and left 2+. No edema.   Abdomen: The abdomen was soft and nontender. Bowel sounds were normal.   Lymphatic: Right: No inguinal adenopathy present. Left: No inguinal adenopathy present.    Musculoskeletal   Gait and station: Gait evaluation demonstrated a normal gait   Cervical spine: Passive and active range of motion are full and without pain. Extension, flexion, lateral flexion, rotation of the cervical spine did not increase or reproduce pain. Cervical facet joint loading maneuvers are negative.   Muscles: Presence of active trigger points levator scapulae   Shoulders: The range of motion of the glenohumeral joints is full and without pain. Rotator cuff strength is 5/5.   Neurological:   Patient is alert and oriented to person, place, and time.   Speech: speech is normal.   Cortical function: Normal mental status.   Cranial nerves: Cranial nerves 2-12 intact.   Reflex  Scores:  Right brachioradialis: 2+  Left brachioradialis: 2+  Right biceps: 2+  Left biceps: 2+  Right triceps: 2+  Left triceps: 2+  Right patellar: 2+  Left patellar: 2+  Right Achilles: 2+  Left Achilles: 2+  Motor strength: 5/5  Motor Tone: normal tone.   Involuntary movements: none.   Superficial/Primitive Reflexes: primitive reflexes were absent.   Right Carrillo: absent  Left Carrillo: absent  Right ankle clonus: absent  Left ankle clonus: absent   Spurling sign is negative. Neck tornado test is negative. Lhermitte sign is negative. Negative long tract signs. Straight leg raising test is negative. Femoral stretch sign is negative.   Sensation: No sensory loss. Sensory exam: intact to light touch, intact pain and temperature sensation, intact vibration sensation and normal proprioception.   There is hyperesthesia, hyperalgesia and allodynia in the territory of the second and third division of the right trigeminal nerve   Coordination: Normal finger to nose and heel to shin. Normal balance and  negative Romberg's sign   Skin and subcutaneous tissue: Skin is warm and intact. No rash noted. No cyanosis.   Psychiatric: Judgment and insight: Normal. Orientation to person, place and time: Normal. Recent and remote memory: Intact. Mood and affect: Normal.       ASSESSMENT:   1. Right trigeminal neuralgia    2. Spinal stenosis in cervical region    3. Bilateral carpal tunnel syndrome    4. Moderate obesity    5. Trigeminal neuralgia           PLAN/MEDICAL DECISION MAKING: I had a lengthy conversation with Mr. Villagomez regarding his severe and recurrent right trigeminal neuralgia.  He continues to have significant pain relief in his trigeminal neuralgia with interventional pain management measures as referenced above, and medication therapy. He continues to take 300 mg of oxcarbazepine twice daily, 10 mg of Baclofen TID, and 600 mg of gabapentin four times daily for trigeminal neuralgia, he denies any adverse effects, he  has remained on this medication for several years with significant results and relief of his trigeminal neuralgia. Although, he does reports at times he will miss a dose of his gabapentin due to forgetfulness. He will use his current prescription and begin 600 mg BID along with tow tablets=1200 mg QHS. He will report to me in about a month or so to let me know how he is doing.  Refills were provided as appropriate. I have reviewed all available medical records as well as previous therapies as referenced above. Therefore I have proposed the following plan:  1.  Pharmacological measures, as follows:   A. Trial gabapentin 600 mg BID, two tablets-1200 mg QHS.     B. Continue baclofen 10 mg: Take 1 tab po tid.     C. Continue oxcarbazepine 150 mg: Take 2 tabs po bid.    D. Continue vitamin B complex B1, B2, B6, B12 and folic acid   2. Follow up in 6 months or sooner if needed.   3.  Interventional pain management measures: None indicated at this time. If pain recurs, we could repeat right trigeminal nerve radiofrequency ablation.  4.  Long-term rehabilitation efforts:  A. The patient does not have a history of falls. I did complete a risk assessment for falls.   B. Patient has declined a comprehensive physical therapy program   C. Patient has declined a referral to Dr. Rusty Valencia for cognitive behavioral therapy, biofeedback and individual therapy for smoking cessation.  D. Patient has been screened for tobacco use: Current tobacco user 2 PPD. Smoking Cessation: I have advised the patient at length regarding the long-term deleterious effects of smoking and have provided the patient lifestyle modification strategies to facilitate smoking cessation. For instance, I have instructed the patient to delay the time that he lights his first cigarette in the morning from 1 hour to 2 hours and to continue pushing back 30-60 minutes, if possible, every day for the rest of the week. We have also discussed pharmacological  interventions in addition to participation in support groups, individual counseling, to name a few to facilitate smoking/nicotine cessation. I spent approximately 3 minutes advising the patient.  He has attempted to quit smoking with Chantix and nicotine patches, he has declined referral to a psychologist or other individual therapy programs to assist him to quit smoking.  He expresses no desire at this time to quit smoking.  5. The patient has been instructed to contact my office with any questions or difficulties. The patient understands the plan and agrees to proceed accordingly.       Patient Care Team:  Case Borden MD as PCP - General (Family Medicine)  Mark Nieto MD as Consulting Physician (Pain Medicine)  Maynor Fregoso MD as Consulting Physician (Urology)     New Medications Ordered This Visit   Medications   • baclofen (LIORESAL) 10 MG tablet     Sig: Take 1 tablet by mouth 3 (Three) Times a Day.     Dispense:  90 tablet     Refill:  5     Please consider 90 day supplies to promote better adherence   • OXcarbazepine (TRILEPTAL) 150 MG tablet     Sig: Take 2 tablets by mouth 2 (Two) Times a Day.     Dispense:  120 tablet     Refill:  5     Please consider 90 day supplies to promote better adherence   • gabapentin (NEURONTIN) 600 MG tablet     Sig: Take one tablet BID, two tablets QHS     Dispense:  120 tablet     Refill:  1         Future Appointments   Date Time Provider Department Center   1/5/2021  8:00 AM Lyly Rich APRN [facility] None         ALONSO Horvath     EMR Dragon/Transcription disclaimer:  Much of this encounter note is an electronic transcription of spoken language to printed text. Electronic transcription of spoken language may permit erroneous, or at times, nonsensical words or phrases to be inadvertently transcribed. Although I have reviewed the note for such errors, some may still exist.

## 2020-07-08 ENCOUNTER — OFFICE VISIT (OUTPATIENT)
Dept: PAIN MEDICINE | Facility: CLINIC | Age: 60
End: 2020-07-08

## 2020-07-08 VITALS
SYSTOLIC BLOOD PRESSURE: 112 MMHG | TEMPERATURE: 98.7 F | RESPIRATION RATE: 16 BRPM | HEART RATE: 74 BPM | DIASTOLIC BLOOD PRESSURE: 70 MMHG | BODY MASS INDEX: 28.56 KG/M2 | OXYGEN SATURATION: 96 % | WEIGHT: 199.5 LBS | HEIGHT: 70 IN

## 2020-07-08 DIAGNOSIS — M48.02 SPINAL STENOSIS IN CERVICAL REGION: ICD-10-CM

## 2020-07-08 DIAGNOSIS — G56.03 BILATERAL CARPAL TUNNEL SYNDROME: ICD-10-CM

## 2020-07-08 DIAGNOSIS — G50.0 TRIGEMINAL NEURALGIA: ICD-10-CM

## 2020-07-08 DIAGNOSIS — E66.8 MODERATE OBESITY: ICD-10-CM

## 2020-07-08 DIAGNOSIS — G50.0 RIGHT TRIGEMINAL NEURALGIA: ICD-10-CM

## 2020-07-08 PROCEDURE — 99213 OFFICE O/P EST LOW 20 MIN: CPT | Performed by: NURSE PRACTITIONER

## 2020-07-08 RX ORDER — BACLOFEN 10 MG/1
10 TABLET ORAL 3 TIMES DAILY
Qty: 90 TABLET | Refills: 5 | Status: SHIPPED | OUTPATIENT
Start: 2020-07-08 | End: 2020-12-10

## 2020-07-08 RX ORDER — ALBUTEROL SULFATE 90 UG/1
AEROSOL, METERED RESPIRATORY (INHALATION)
COMMUNITY
Start: 2019-04-09 | End: 2022-04-22

## 2020-07-08 RX ORDER — OXCARBAZEPINE 150 MG/1
300 TABLET, FILM COATED ORAL 2 TIMES DAILY
Qty: 120 TABLET | Refills: 5 | Status: SHIPPED | OUTPATIENT
Start: 2020-07-08

## 2020-07-08 RX ORDER — GABAPENTIN 600 MG/1
TABLET ORAL
Qty: 120 TABLET | Refills: 1 | Status: SHIPPED | OUTPATIENT
Start: 2020-07-08 | End: 2020-08-03

## 2020-08-03 DIAGNOSIS — G50.0 RIGHT TRIGEMINAL NEURALGIA: Primary | ICD-10-CM

## 2020-08-03 RX ORDER — GABAPENTIN 800 MG/1
800 TABLET ORAL 4 TIMES DAILY
Qty: 120 TABLET | Refills: 3 | Status: SHIPPED | OUTPATIENT
Start: 2020-08-03 | End: 2020-12-10

## 2020-12-10 DIAGNOSIS — G50.0 RIGHT TRIGEMINAL NEURALGIA: ICD-10-CM

## 2020-12-10 RX ORDER — GABAPENTIN 800 MG/1
TABLET ORAL
Qty: 120 TABLET | Refills: 0 | Status: SHIPPED | OUTPATIENT
Start: 2020-12-10 | End: 2021-01-05 | Stop reason: SDUPTHER

## 2020-12-10 RX ORDER — BACLOFEN 10 MG/1
TABLET ORAL
Qty: 270 TABLET | Refills: 0 | Status: SHIPPED | OUTPATIENT
Start: 2020-12-10

## 2021-01-02 NOTE — PROGRESS NOTES
"Chief Complaint: \"I am doing very well. I had cyberknife treatment.\"       History of Present Illness:   Patient: Mr. Maynor Villagomez, 60 y.o. male,  returns for evaluation of trigeminal neuralgia and medication refills. At the time of his last office visit with me on July 8, 2020 he was experiencing about 60% pain relief from right trigeminal nerve ablation performed on January 8, 2020. At that point he was complaining of intermittent trigeminal pain, that can be severe at times, he tells me in September 2020 his pain became significantly severe and he did was unable to work.  He reports after doing some research he found treatment at the [facility], and underwent CyberKnife treatment for trigeminal neuralgia in October 2020 by Dr. Tanner Barrett.  He reports remarkable response to this treatment and he has currently weaned off Trileptal, baclofen, and has reduced his gabapentin to 800 mg twice daily.  He reports he is able to perform activities such as brushing his teeth touching his face, chewing and use electronic razor which she has not done in 10 years without pain or shocking to the area of his trigeminal nerve.   Pain intensity today: 0/10  Average pain intensity: 2/10  Description of pain:  Location of pain: right-sided unilateral predominantly on the second and third division of the right trigeminal nerve  Radiation of pain : none  Character of pain: Previously crushing, sharp, shooting, stabbing and throbbing  Severity of pain: He has not experienced a severe attack in 3 months  Duration of pain attacks: He has not experienced a severe attack in 3 months  Accompanying symptoms: None  Triggering factors: None at this time  Rapidity of onset: sudden  Are most pain episodes similar in presentation? yes  Started having trigeminal neuralgia 10 years ago  Worst time of day: N/A   Awaken from sleep?: no  Seasonal pattern?: no  Overall pattern since problem began: Pain resolved " after right trigeminal nerve radiofrequency ablation for more than 5 years, and most recently more than 3 years and now since trigeminal cyberknife  Degree of Functional Impairment: N/A  Current Use of Meds to Treat his trigeminal neuralgia: Gabapentin 800 mg 2 times a day. He has DC'd Trileptal and baclofen        Review of previous therapies and additional medical records:  Interventional measures: 01/08/2020: Right trigeminal nerve RFTC  05/04/2016: Right trigeminal nerve RFTC  03/30/2016: Cervical epidural steroid injection, C6-C7 interlaminar approach  06/15/2011: Right trigeminal nerve RFTC (experienced complete resolution for 5 years)  12/04/2013: Bilateral carpal tunnel injections  CyberKnife treatment for trigeminal neuralgia in October 2020 by Dr. Tanner Barrett at [facility].  Mr. Maynor Villagomez, 60 y.o. male  presents with significant comorbidities including nicotine addiction,  not engaged in treatment., hypertension and hyperlipidemia engaged in treatment.  In terms of current analgesics,Mr. Maynor Villagomez, 60 y.o. male takes: gabapentin.  I have reviewed Willie Report #[id number] consistent to medication reconciliation.    Global Pain Scale 10-17-  2018 05-30-  2019 12-12-  2019 07-08  2020 01-05  2021      Pain 5 6 16 8 1      Feelings 2 2 0 0 0      Clinical outcomes 1 1 0 0 0      Activities 0 0 0 0 0      GPS Total: 8 9 16 8 1        Review of Diagnostic Studies:  There are no new diagnostic studies available for review.    Review of Systems   All other systems reviewed and are negative.        Patient Active Problem List   Diagnosis   • Right trigeminal neuralgia   • Spinal stenosis in cervical region   • Bilateral carpal tunnel syndrome   • Moderate obesity       Past Medical History:   Diagnosis Date   • Depression    • Generalized erosion of teeth    • History of alcohol abuse    • History of carpal tunnel syndrome    • History of hepatitis    • History of hypotension    •  "History of nicotine dependence    • History of trigeminal neuralgia          Past Surgical History:   Procedure Laterality Date   • ARM WOUND REPAIR / CLOSURE Right    • MOUTH SURGERY      Tooth Extraction         Family History   Problem Relation Age of Onset   • Diabetes type II Mother    • Heart disease Father          Social History     Socioeconomic History   • Marital status:      Spouse name: Not on file   • Number of children: Not on file   • Years of education: Not on file   • Highest education level: Not on file   Tobacco Use   • Smoking status: Current Every Day Smoker   Substance and Sexual Activity   • Alcohol use: No   • Drug use: No           Current Outpatient Medications:   •  albuterol sulfate HFA (ProAir HFA) 108 (90 Base) MCG/ACT inhaler, inhale 2 puff by inhalation route  every 4 - 6 hours as needed, Disp: , Rfl:   •  amLODIPine (NORVASC) 5 MG tablet, Take  by mouth., Disp: , Rfl:   •  B Complex Vitamins (VITAMIN B COMPLEX PO), Take  by mouth., Disp: , Rfl:   •  baclofen (LIORESAL) 10 MG tablet, TAKE 1 TABLET BY MOUTH THREE TIMES DAILY, Disp: 270 tablet, Rfl: 0  •  Cholecalciferol (VITAMIN D3) 2000 UNITS capsule, Take 1 capsule by mouth 2 (two) times a day., Disp: , Rfl:   •  gabapentin (NEURONTIN) 800 MG tablet, Take 1 tablet by mouth 4 times daily (Patient taking differently: 800 mg 2 (Two) Times a Day.), Disp: 120 tablet, Rfl: 0  •  lisinopril-hydrochlorothiazide (PRINZIDE,ZESTORETIC) 10-12.5 MG per tablet, Take 1 tablet by mouth daily., Disp: , Rfl:   •  Multiple Vitamin tablet, Take 1 tablet by mouth daily., Disp: , Rfl:   •  OXcarbazepine (TRILEPTAL) 150 MG tablet, Take 2 tablets by mouth 2 (Two) Times a Day., Disp: 120 tablet, Rfl: 5      Allergies   Allergen Reactions   • Cephalexin Anaphylaxis   • Penicillins Anaphylaxis         /92   Pulse 91   Temp 98.1 °F (36.7 °C)   Ht 177.8 cm (70\")   Wt 90.4 kg (199 lb 6.4 oz)   SpO2 97%   BMI 28.61 kg/m²      Physical " Exam:  Constitutional: Patient is oriented to person, place, and time.   Patient appears well-developed and well-nourished.   HEENT: Head: Normocephalic and atraumatic.   Eyes: Conjunctivae and lids are normal.   Pupils: Equal, round, reactive to light.   TMJ: Mandibular opening slightly decreased. No grinding, popping, crepitus, or clicking was noted. Palpation of the joint in the ear canal while the patient opens and closes the mandible reveals normal condylar movement without tenderness, but does elicit pain from trigeminal nerve on the right side.   Neck: Trachea normal. Neck supple. No JVD present.   Lymphatic: No cervical adenopathy  Pulmonary Respiratory effort: No increased work of breathing or signs of respiratory distress. Auscultation of lungs: Clear to auscultation.   Cardiovascular Auscultation of heart: Normal rate and rhythm, normal S1 and S2, no murmurs.   Peripheral vascular exam:  Femoral: right 2+, left 2+. Posterior tibialis: right 2+ and left 2+. Dorsalis pedis: right 2+ and left 2+. No edema.   Abdomen: The abdomen was soft and nontender. Bowel sounds were normal.   Lymphatic: Right: No inguinal adenopathy present. Left: No inguinal adenopathy present.    Musculoskeletal   Gait and station: Gait evaluation demonstrated a normal gait   Cervical spine: Passive and active range of motion are full and without pain. Extension, flexion, lateral flexion, rotation of the cervical spine did not increase or reproduce pain. Cervical facet joint loading maneuvers are negative.   Muscles: Presence of active trigger points levator scapulae   Shoulders: The range of motion of the glenohumeral joints is full and without pain. Rotator cuff strength is 5/5.   Neurological:   Patient is alert and oriented to person, place, and time.   Speech: speech is normal.   Cortical function: Normal mental status.   Cranial nerves: Cranial nerves 2-12 intact.   Reflex Scores:  Right brachioradialis: 2+  Left brachioradialis:  2+  Right biceps: 2+  Left biceps: 2+  Right triceps: 2+  Left triceps: 2+  Right patellar: 2+  Left patellar: 2+  Right Achilles: 2+  Left Achilles: 2+  Motor strength: 5/5  Motor Tone: normal tone.   Involuntary movements: none.   Superficial/Primitive Reflexes: primitive reflexes were absent.   Right Carrillo: absent  Left Carrillo: absent  Right ankle clonus: absent  Left ankle clonus: absent   Spurling sign is negative. Neck tornado test is negative. Lhermitte sign is negative. Negative long tract signs. Straight leg raising test is negative. Femoral stretch sign is negative.   Sensation: No sensory loss. Sensory exam: intact to light touch, intact pain and temperature sensation, intact vibration sensation and normal proprioception.   There is still some minimal numbness in the area of the second and third division of the right trigeminal nerve, no hyperesthesia, hyperalgesia and allodynia   Coordination: Normal finger to nose and heel to shin. Normal balance and  negative Romberg's sign   Skin and subcutaneous tissue: Skin is warm and intact. No rash noted. No cyanosis.   Psychiatric: Judgment and insight: Normal. Orientation to person, place and time: Normal. Recent and remote memory: Intact. Mood and affect: Normal.       ASSESSMENT:   1. Right trigeminal neuralgia    2. Spinal stenosis in cervical region    3. Bilateral carpal tunnel syndrome    4. Current every day smoker           PLAN/MEDICAL DECISION MAKING: I had a lengthy conversation with Mr. Villagomez regarding his severe and recurrent right trigeminal neuralgia.  He continues to have marginal pain relief of his trigeminal neuralgia with interventional pain management measures as referenced above, and medication therapy. He tells me in September 2020 his pain became significantly severe and he did was unable to work.  He reports after doing some research he found treatment at the [facility], and underwent CyberKnife  treatment for trigeminal neuralgia in October 2020 by Dr. Tanner Barrett.  He reports remarkable response to this treatment and he has currently weaned off Trileptal, baclofen, and has reduced his gabapentin to 800 mg twice daily.  He will continue to wean off of his gabapentin as tolerated, he will keep me updated on his progress.  I have reviewed all available medical records as well as previous therapies as referenced above. Therefore I have proposed the following plan:  1.  Pharmacological measures, as follows:   A. Continue gabapentin 800 mg BID. #60. 2 refills.    D. Continue vitamin B complex B1, B2, B6, B12 and folic acid   2.  Follow up in 6 months.   3.  Long-term rehabilitation efforts:  A. The patient does not have a history of falls. I did complete a risk assessment for falls.   B. Patient has declined a comprehensive physical therapy program   C. Patient has declined a referral to Dr. Rusty Valencia for cognitive behavioral therapy, biofeedback and individual therapy for smoking cessation.  D. Patient has been screened for tobacco use: Current tobacco user 2 PPD. Smoking Cessation: I have advised the patient at length regarding the long-term deleterious effects of smoking and have provided the patient lifestyle modification strategies to facilitate smoking cessation. For instance, I have instructed the patient to delay the time that he lights his first cigarette in the morning from 1 hour to 2 hours and to continue pushing back 30-60 minutes, if possible, every day for the rest of the week. We have also discussed pharmacological interventions in addition to participation in support groups, individual counseling, to name a few to facilitate smoking/nicotine cessation. I spent approximately 3 minutes advising the patient.  He has attempted to quit smoking with Chantix and nicotine patches, he has declined referral to a psychologist or other individual therapy programs to assist him to quit smoking.  He  expresses no desire at this time to quit smoking.  4. The patient has been instructed to contact my office with any questions or difficulties. The patient understands the plan and agrees to proceed accordingly.       Patient Care Team:  Case Borden MD as PCP - General (Family Medicine)  Mark Nieto MD as Consulting Physician (Pain Medicine)  Maynor Fregoso MD as Consulting Physician (Urology)     No orders of the defined types were placed in this encounter.        No future appointments.      ALONSO Horvath     EMR Dragon/Transcription disclaimer:  Much of this encounter note is an electronic transcription of spoken language to printed text. Electronic transcription of spoken language may permit erroneous, or at times, nonsensical words or phrases to be inadvertently transcribed. Although I have reviewed the note for such errors, some may still exist.

## 2021-01-05 ENCOUNTER — OFFICE VISIT (OUTPATIENT)
Dept: PAIN MEDICINE | Facility: CLINIC | Age: 61
End: 2021-01-05

## 2021-01-05 VITALS
HEART RATE: 91 BPM | DIASTOLIC BLOOD PRESSURE: 92 MMHG | SYSTOLIC BLOOD PRESSURE: 147 MMHG | WEIGHT: 199.4 LBS | TEMPERATURE: 98.1 F | BODY MASS INDEX: 28.55 KG/M2 | OXYGEN SATURATION: 97 % | HEIGHT: 70 IN

## 2021-01-05 DIAGNOSIS — F17.200 CURRENT EVERY DAY SMOKER: ICD-10-CM

## 2021-01-05 DIAGNOSIS — M48.02 SPINAL STENOSIS IN CERVICAL REGION: ICD-10-CM

## 2021-01-05 DIAGNOSIS — G50.0 RIGHT TRIGEMINAL NEURALGIA: ICD-10-CM

## 2021-01-05 DIAGNOSIS — G56.03 BILATERAL CARPAL TUNNEL SYNDROME: ICD-10-CM

## 2021-01-05 PROCEDURE — 99213 OFFICE O/P EST LOW 20 MIN: CPT | Performed by: NURSE PRACTITIONER

## 2021-01-05 RX ORDER — GABAPENTIN 800 MG/1
800 TABLET ORAL 2 TIMES DAILY
Qty: 60 TABLET | Refills: 5 | Status: SHIPPED | OUTPATIENT
Start: 2021-01-05 | End: 2022-04-22

## 2022-04-22 ENCOUNTER — OFFICE VISIT (OUTPATIENT)
Dept: FAMILY MEDICINE CLINIC | Facility: CLINIC | Age: 62
End: 2022-04-22

## 2022-04-22 VITALS
OXYGEN SATURATION: 95 % | HEART RATE: 82 BPM | SYSTOLIC BLOOD PRESSURE: 122 MMHG | BODY MASS INDEX: 27.71 KG/M2 | TEMPERATURE: 97.7 F | WEIGHT: 197.9 LBS | HEIGHT: 71 IN | DIASTOLIC BLOOD PRESSURE: 78 MMHG | RESPIRATION RATE: 14 BRPM

## 2022-04-22 DIAGNOSIS — I10 HYPERTENSION, ESSENTIAL: ICD-10-CM

## 2022-04-22 DIAGNOSIS — R06.2 WHEEZING: ICD-10-CM

## 2022-04-22 DIAGNOSIS — Z72.0 TOBACCO USE: ICD-10-CM

## 2022-04-22 DIAGNOSIS — R73.9 HYPERGLYCEMIA: Primary | ICD-10-CM

## 2022-04-22 PROBLEM — A63.0 CONDYLOMA ACUMINATUM: Status: ACTIVE | Noted: 2019-10-02

## 2022-04-22 PROBLEM — M48.02 SPINAL STENOSIS OF CERVICAL REGION: Status: ACTIVE | Noted: 2022-04-22

## 2022-04-22 PROBLEM — N48.89 EDEMA OF PENIS: Status: ACTIVE | Noted: 2019-10-02

## 2022-04-22 PROBLEM — E83.119 HEMOCHROMATOSIS: Status: ACTIVE | Noted: 2022-04-22

## 2022-04-22 PROBLEM — N40.1 LOWER URINARY TRACT SYMPTOMS DUE TO BENIGN PROSTATIC HYPERPLASIA: Status: ACTIVE | Noted: 2019-10-02

## 2022-04-22 PROBLEM — R63.4 WEIGHT LOSS, NON-INTENTIONAL: Status: ACTIVE | Noted: 2022-04-22

## 2022-04-22 PROBLEM — G50.0 TRIGEMINAL NEURALGIA: Status: ACTIVE | Noted: 2022-04-22

## 2022-04-22 PROBLEM — L03.314 CELLULITIS OF GROIN: Status: ACTIVE | Noted: 2019-10-02

## 2022-04-22 PROCEDURE — 99214 OFFICE O/P EST MOD 30 MIN: CPT | Performed by: FAMILY MEDICINE

## 2022-04-22 RX ORDER — MELOXICAM 15 MG/1
15 TABLET ORAL DAILY
COMMUNITY
Start: 2022-02-22 | End: 2022-05-18

## 2022-04-22 RX ORDER — CARBAMAZEPINE 200 MG/1
1 TABLET ORAL EVERY 12 HOURS
COMMUNITY
Start: 2020-08-20 | End: 2022-04-22 | Stop reason: SDUPTHER

## 2022-04-22 RX ORDER — ALBUTEROL SULFATE 90 UG/1
2 AEROSOL, METERED RESPIRATORY (INHALATION) EVERY 4 HOURS PRN
Qty: 3 G | Refills: 1 | Status: SHIPPED | OUTPATIENT
Start: 2022-04-22

## 2022-04-22 RX ORDER — AMLODIPINE BESYLATE 5 MG/1
TABLET ORAL
COMMUNITY
Start: 2020-12-10 | End: 2022-04-22

## 2022-04-22 RX ORDER — LISINOPRIL AND HYDROCHLOROTHIAZIDE 12.5; 1 MG/1; MG/1
1 TABLET ORAL DAILY
Qty: 90 TABLET | Refills: 1 | Status: SHIPPED | OUTPATIENT
Start: 2022-04-22 | End: 2022-08-15

## 2022-04-22 RX ORDER — AMLODIPINE BESYLATE 5 MG/1
5 TABLET ORAL DAILY
Qty: 90 TABLET | Refills: 1 | Status: SHIPPED | OUTPATIENT
Start: 2022-04-22 | End: 2023-02-10

## 2022-04-22 NOTE — ASSESSMENT & PLAN NOTE
Is approximately 40 pack years.  He is going to try the nicotine patch.  Will not order a low-dose CAT scan.

## 2022-04-22 NOTE — PROGRESS NOTES
Patient Name: Maynor Villagomez  : 1960   MRN: [id number]     Chief Complaint:    Chief Complaint   Patient presents with   • Med Refill     Pt here for medication refills        History of Present Illness: Maynor Villagomez is a 61 y.o. male who is here today for follow up for blood pressure  HPI        Review of Systems:   Review of Systems   Constitutional: Negative.    HENT: Negative.    Eyes: Negative.    Respiratory: Positive for wheezing.    Cardiovascular: Negative.    Gastrointestinal: Negative.    Neurological: Negative.         Past Medical History:   Past Medical History:   Diagnosis Date   • Depression    • Generalized erosion of teeth    • History of alcohol abuse    • History of carpal tunnel syndrome    • History of hepatitis    • History of hypotension    • History of nicotine dependence    • History of trigeminal neuralgia    • Hypertension        Past Surgical History:   Past Surgical History:   Procedure Laterality Date   • ARM WOUND REPAIR / CLOSURE Right    • MOUTH SURGERY      Tooth Extraction       Family History:   Family History   Problem Relation Age of Onset   • Diabetes type II Mother    • Heart disease Father        Social History:   Social History     Socioeconomic History   • Marital status:    Tobacco Use   • Smoking status: Current Every Day Smoker     Packs/day: 1.00     Years: 46.00     Pack years: 46.00     Start date:    • Smokeless tobacco: Never Used   Substance and Sexual Activity   • Alcohol use: No   • Drug use: No   • Sexual activity: Yes       Medications:     Current Outpatient Medications:   •  amLODIPine (NORVASC) 5 MG tablet, Take 1 tablet by mouth Daily., Disp: 90 tablet, Rfl: 1  •  B Complex Vitamins (VITAMIN B COMPLEX PO), Take  by mouth., Disp: , Rfl:   •  lisinopril-hydrochlorothiazide (PRINZIDE,ZESTORETIC) 10-12.5 MG per tablet, Take 1 tablet by mouth Daily., Disp: 90 tablet, Rfl: 1  •  meloxicam (MOBIC) 15 MG tablet, Take 15 mg by mouth  "Daily., Disp: , Rfl:   •  albuterol sulfate HFA (Ventolin HFA) 108 (90 Base) MCG/ACT inhaler, Inhale 2 puffs Every 4 (Four) Hours As Needed for Wheezing., Disp: 3 g, Rfl: 1  •  baclofen (LIORESAL) 10 MG tablet, TAKE 1 TABLET BY MOUTH THREE TIMES DAILY, Disp: 270 tablet, Rfl: 0  •  OXcarbazepine (TRILEPTAL) 150 MG tablet, Take 2 tablets by mouth 2 (Two) Times a Day., Disp: 120 tablet, Rfl: 5    Allergies:   Allergies   Allergen Reactions   • Amoxicillin Anaphylaxis   • Cephalexin Anaphylaxis   • Penicillins Anaphylaxis         Physical Exam:  Vital Signs:   Vitals:    04/22/22 1510   BP: 122/78   BP Location: Left arm   Patient Position: Sitting   Cuff Size: Adult   Pulse: 82   Resp: 14   Temp: 97.7 °F (36.5 °C)   TempSrc: Temporal   SpO2: 95%   Weight: 89.8 kg (197 lb 14.4 oz)   Height: 180.3 cm (71\")   PainSc:   2   PainLoc: Hand     Body mass index is 27.6 kg/m².     Physical Exam  Vitals and nursing note reviewed.   Constitutional:       Appearance: Normal appearance. He is normal weight.   HENT:      Head: Normocephalic and atraumatic.      Right Ear: Tympanic membrane, ear canal and external ear normal.      Left Ear: Tympanic membrane, ear canal and external ear normal.      Nose: Nose normal.      Mouth/Throat:      Mouth: Mucous membranes are dry.      Pharynx: Oropharynx is clear.   Eyes:      Extraocular Movements: Extraocular movements intact.      Conjunctiva/sclera: Conjunctivae normal.      Pupils: Pupils are equal, round, and reactive to light.   Cardiovascular:      Rate and Rhythm: Normal rate and regular rhythm.      Pulses: Normal pulses.      Heart sounds: Normal heart sounds.   Pulmonary:      Effort: Pulmonary effort is normal.      Breath sounds: Normal breath sounds.   Musculoskeletal:      Cervical back: Normal range of motion and neck supple.   Feet:      Comments:      Neurological:      Mental Status: He is alert.         Procedures      Assessment/Plan:   Diagnoses and all orders for " this visit:    1. Hyperglycemia (Primary)  -     Comprehensive Metabolic Panel; Future  -     Hemoglobin A1c; Future  -     Lipid Panel; Future  -      CT Chest Low Dose Cancer Screening WO; Future    2. Hypertension, essential  Assessment & Plan:  Discussed with patient to monitor their blood pressure and if systolic blood pressure goes above 140 or diastolic is above 90 to return to clinic.  Take medicines as directed, call for any problems, patient not having or any worrisome symptoms.        Orders:  -     Comprehensive Metabolic Panel; Future  -     Hemoglobin A1c; Future  -     Lipid Panel; Future  -      CT Chest Low Dose Cancer Screening WO; Future    3. Tobacco use  Assessment & Plan:  Is approximately 40 pack years.  He is going to try the nicotine patch.  Will not order a low-dose CAT scan.    Orders:  -     Comprehensive Metabolic Panel; Future  -     Hemoglobin A1c; Future  -     Lipid Panel; Future  -      CT Chest Low Dose Cancer Screening WO; Future    4. Wheezing  -     Comprehensive Metabolic Panel; Future  -     Hemoglobin A1c; Future  -     Lipid Panel; Future  -      CT Chest Low Dose Cancer Screening WO; Future    Other orders  -     albuterol sulfate HFA (Ventolin HFA) 108 (90 Base) MCG/ACT inhaler; Inhale 2 puffs Every 4 (Four) Hours As Needed for Wheezing.  Dispense: 3 g; Refill: 1  -     amLODIPine (NORVASC) 5 MG tablet; Take 1 tablet by mouth Daily.  Dispense: 90 tablet; Refill: 1  -     lisinopril-hydrochlorothiazide (PRINZIDE,ZESTORETIC) 10-12.5 MG per tablet; Take 1 tablet by mouth Daily.  Dispense: 90 tablet; Refill: 1           Follow Up:   No follow-ups on file.    Case Borden MD  [facility]

## 2022-05-18 RX ORDER — MELOXICAM 15 MG/1
TABLET ORAL
Qty: 90 TABLET | Refills: 0 | Status: SHIPPED | OUTPATIENT
Start: 2022-05-18 | End: 2022-08-15

## 2022-05-18 NOTE — TELEPHONE ENCOUNTER
Rx Refill Note    Requested Prescriptions     Pending Prescriptions Disp Refills   • meloxicam (MOBIC) 15 MG tablet [Pharmacy Med Name: Meloxicam 15 MG Oral Tablet] 90 tablet 0     Sig: Take 1 tablet by mouth once daily        Last office visit with prescribing clinician: 4/22/2022      Next office visit with prescribing clinician: Visit date not found   Last labs:   Last refill: 02/22/22   Pharmacy (be sure to add in Epic). correct

## 2022-06-01 DIAGNOSIS — R06.2 WHEEZING: ICD-10-CM

## 2022-06-01 DIAGNOSIS — Z72.0 TOBACCO USE: ICD-10-CM

## 2022-06-01 DIAGNOSIS — R73.9 HYPERGLYCEMIA: ICD-10-CM

## 2022-06-01 DIAGNOSIS — I10 HYPERTENSION, ESSENTIAL: ICD-10-CM

## 2022-08-15 RX ORDER — LISINOPRIL AND HYDROCHLOROTHIAZIDE 20; 12.5 MG/1; MG/1
TABLET ORAL
Qty: 90 TABLET | Refills: 0 | Status: SHIPPED | OUTPATIENT
Start: 2022-08-15 | End: 2022-11-11

## 2022-08-15 RX ORDER — MELOXICAM 15 MG/1
TABLET ORAL
Qty: 90 TABLET | Refills: 0 | Status: SHIPPED | OUTPATIENT
Start: 2022-08-15 | End: 2022-11-11

## 2022-11-11 RX ORDER — LISINOPRIL AND HYDROCHLOROTHIAZIDE 20; 12.5 MG/1; MG/1
TABLET ORAL
Qty: 90 TABLET | Refills: 0 | Status: SHIPPED | OUTPATIENT
Start: 2022-11-11 | End: 2023-02-10

## 2022-11-11 RX ORDER — MELOXICAM 15 MG/1
TABLET ORAL
Qty: 90 TABLET | Refills: 0 | Status: SHIPPED | OUTPATIENT
Start: 2022-11-11 | End: 2023-02-10

## 2022-11-11 NOTE — TELEPHONE ENCOUNTER
Rx Refill Note    Requested Prescriptions     Pending Prescriptions Disp Refills   • lisinopril-hydrochlorothiazide (PRINZIDE,ZESTORETIC) 20-12.5 MG per tablet [Pharmacy Med Name: Lisinopril-hydroCHLOROthiazide 20-12.5 MG Oral Tablet] 90 tablet 0     Sig: Take 1 tablet by mouth once daily   • meloxicam (MOBIC) 15 MG tablet [Pharmacy Med Name: Meloxicam 15 MG Oral Tablet] 90 tablet 0     Sig: Take 1 tablet by mouth once daily        Last office visit with prescribing clinician: 4/22/2022      Next office visit with prescribing clinician: 11/23/2022   Last labs:   Last refill: 08/15/2022   Pharmacy (be sure to add in Epic). correct

## 2022-11-12 RX ORDER — MELOXICAM 15 MG/1
TABLET ORAL
Qty: 90 TABLET | Refills: 0 | OUTPATIENT
Start: 2022-11-12

## 2022-11-12 RX ORDER — LISINOPRIL AND HYDROCHLOROTHIAZIDE 20; 12.5 MG/1; MG/1
TABLET ORAL
Qty: 90 TABLET | Refills: 0 | OUTPATIENT
Start: 2022-11-12

## 2022-11-12 NOTE — TELEPHONE ENCOUNTER
Rx Refill Note    Requested Prescriptions     Pending Prescriptions Disp Refills   • lisinopril-hydrochlorothiazide (PRINZIDE,ZESTORETIC) 20-12.5 MG per tablet [Pharmacy Med Name: Lisinopril-hydroCHLOROthiazide 20-12.5 MG Oral Tablet] 90 tablet 0     Sig: Take 1 tablet by mouth once daily   • meloxicam (MOBIC) 15 MG tablet [Pharmacy Med Name: Meloxicam 15 MG Oral Tablet] 90 tablet 0     Sig: Take 1 tablet by mouth once daily        Last office visit with prescribing clinician: 4/22/2022      Next office visit with prescribing clinician: 11/23/2022   Last labs: 04/22/2022  Last refill:  Sent 11/11/2022 and confirmed  Pharmacy [facility]

## 2022-11-16 ENCOUNTER — TELEPHONE (OUTPATIENT)
Dept: FAMILY MEDICINE CLINIC | Facility: CLINIC | Age: 62
End: 2022-11-16

## 2022-11-16 NOTE — TELEPHONE ENCOUNTER
Called patient to reschedule Nov 23 with Dr Borden due to provider being out of office that day. Left voicemail for patient to call back and reschedule.

## 2023-02-10 RX ORDER — MELOXICAM 15 MG/1
TABLET ORAL
Qty: 30 TABLET | Refills: 0 | Status: SHIPPED | OUTPATIENT
Start: 2023-02-10 | End: 2023-03-21

## 2023-02-10 RX ORDER — AMLODIPINE BESYLATE 5 MG/1
TABLET ORAL
Qty: 30 TABLET | Refills: 0 | Status: SHIPPED | OUTPATIENT
Start: 2023-02-10 | End: 2023-03-21

## 2023-02-10 RX ORDER — LISINOPRIL AND HYDROCHLOROTHIAZIDE 20; 12.5 MG/1; MG/1
TABLET ORAL
Qty: 30 TABLET | Refills: 0 | Status: SHIPPED | OUTPATIENT
Start: 2023-02-10 | End: 2023-03-21

## 2023-02-10 NOTE — TELEPHONE ENCOUNTER
Rx Refill Note    Requested Prescriptions     Pending Prescriptions Disp Refills   • amLODIPine (NORVASC) 5 MG tablet [Pharmacy Med Name: amLODIPine Besylate 5 MG Oral Tablet] 90 tablet 0     Sig: Take 1 tablet by mouth once daily   • lisinopril-hydrochlorothiazide (PRINZIDE,ZESTORETIC) 20-12.5 MG per tablet [Pharmacy Med Name: Lisinopril-hydroCHLOROthiazide 20-12.5 MG Oral Tablet] 90 tablet 0     Sig: Take 1 tablet by mouth once daily   • meloxicam (MOBIC) 15 MG tablet [Pharmacy Med Name: Meloxicam 15 MG Oral Tablet] 90 tablet 0     Sig: Take 1 tablet by mouth once daily        Last office visit with prescribing clinician: 4/22/2022      Next office visit with prescribing clinician: Visit date not found   Last labs:   Last refill:    Pharmacy [facility]

## 2023-03-21 RX ORDER — AMLODIPINE BESYLATE 5 MG/1
TABLET ORAL
Qty: 30 TABLET | Refills: 0 | Status: SHIPPED | OUTPATIENT
Start: 2023-03-21

## 2023-03-21 RX ORDER — LISINOPRIL AND HYDROCHLOROTHIAZIDE 20; 12.5 MG/1; MG/1
TABLET ORAL
Qty: 30 TABLET | Refills: 0 | Status: SHIPPED | OUTPATIENT
Start: 2023-03-21

## 2023-03-21 RX ORDER — MELOXICAM 15 MG/1
TABLET ORAL
Qty: 30 TABLET | Refills: 0 | Status: SHIPPED | OUTPATIENT
Start: 2023-03-21

## 2023-03-21 NOTE — TELEPHONE ENCOUNTER
Rx Refill Note    Requested Prescriptions     Pending Prescriptions Disp Refills   • amLODIPine (NORVASC) 5 MG tablet [Pharmacy Med Name: amLODIPine Besylate 5 MG Oral Tablet] 30 tablet 0     Sig: Take 1 tablet by mouth once daily   • lisinopril-hydrochlorothiazide (PRINZIDE,ZESTORETIC) 20-12.5 MG per tablet [Pharmacy Med Name: Lisinopril-hydroCHLOROthiazide 20-12.5 MG Oral Tablet] 30 tablet 0     Sig: Take 1 tablet by mouth once daily   • meloxicam (MOBIC) 15 MG tablet [Pharmacy Med Name: Meloxicam 15 MG Oral Tablet] 30 tablet 0     Sig: Take 1 tablet by mouth once daily        Last office visit with prescribing clinician: 4/22/2022      Next office visit with prescribing clinician: Visit date not found   Last labs:   Last refill:    Pharmacy [facility]    Called pt left message, and explained he needs appt it has been almost a year.

## 2023-06-07 ENCOUNTER — OFFICE VISIT (OUTPATIENT)
Dept: FAMILY MEDICINE CLINIC | Facility: CLINIC | Age: 63
End: 2023-06-07
Payer: COMMERCIAL

## 2023-06-07 VITALS
TEMPERATURE: 98 F | HEART RATE: 84 BPM | SYSTOLIC BLOOD PRESSURE: 162 MMHG | HEIGHT: 70 IN | OXYGEN SATURATION: 96 % | BODY MASS INDEX: 26.99 KG/M2 | WEIGHT: 188.5 LBS | RESPIRATION RATE: 15 BRPM | DIASTOLIC BLOOD PRESSURE: 94 MMHG

## 2023-06-07 DIAGNOSIS — Z00.00 GENERAL MEDICAL EXAM: ICD-10-CM

## 2023-06-07 DIAGNOSIS — Z11.59 NEED FOR HEPATITIS C SCREENING TEST: ICD-10-CM

## 2023-06-07 DIAGNOSIS — Z23 ENCOUNTER FOR IMMUNIZATION: ICD-10-CM

## 2023-06-07 DIAGNOSIS — Z13.220 SCREENING FOR HYPERLIPIDEMIA: ICD-10-CM

## 2023-06-07 DIAGNOSIS — Z13.1 SCREENING FOR DIABETES MELLITUS: ICD-10-CM

## 2023-06-07 DIAGNOSIS — M15.9 OSTEOARTHRITIS OF MULTIPLE JOINTS, UNSPECIFIED OSTEOARTHRITIS TYPE: ICD-10-CM

## 2023-06-07 DIAGNOSIS — Z12.5 SCREENING FOR PROSTATE CANCER: ICD-10-CM

## 2023-06-07 DIAGNOSIS — I10 PRIMARY HYPERTENSION: Primary | ICD-10-CM

## 2023-06-07 DIAGNOSIS — G56.03 BILATERAL CARPAL TUNNEL SYNDROME: ICD-10-CM

## 2023-06-07 DIAGNOSIS — E83.110 HEREDITARY HEMOCHROMATOSIS: ICD-10-CM

## 2023-06-07 PROBLEM — N40.1 LOWER URINARY TRACT SYMPTOMS DUE TO BENIGN PROSTATIC HYPERPLASIA: Status: RESOLVED | Noted: 2019-10-02 | Resolved: 2023-06-07

## 2023-06-07 PROBLEM — N48.89 EDEMA OF PENIS: Status: RESOLVED | Noted: 2019-10-02 | Resolved: 2023-06-07

## 2023-06-07 PROBLEM — R73.9 HYPERGLYCEMIA: Status: RESOLVED | Noted: 2022-04-22 | Resolved: 2023-06-07

## 2023-06-07 PROBLEM — G50.0 TRIGEMINAL NEURALGIA: Status: RESOLVED | Noted: 2022-04-22 | Resolved: 2023-06-07

## 2023-06-07 PROBLEM — R06.2 WHEEZING: Status: RESOLVED | Noted: 2022-04-22 | Resolved: 2023-06-07

## 2023-06-07 PROBLEM — R63.4 WEIGHT LOSS, NON-INTENTIONAL: Status: RESOLVED | Noted: 2022-04-22 | Resolved: 2023-06-07

## 2023-06-07 PROBLEM — A63.0 CONDYLOMA ACUMINATUM: Status: RESOLVED | Noted: 2019-10-02 | Resolved: 2023-06-07

## 2023-06-07 PROBLEM — L03.314 CELLULITIS OF GROIN: Status: RESOLVED | Noted: 2019-10-02 | Resolved: 2023-06-07

## 2023-06-07 PROBLEM — M48.02 SPINAL STENOSIS OF CERVICAL REGION: Status: RESOLVED | Noted: 2022-04-22 | Resolved: 2023-06-07

## 2023-06-07 RX ORDER — AMLODIPINE BESYLATE 5 MG/1
5 TABLET ORAL DAILY
Qty: 90 TABLET | Refills: 0 | Status: SHIPPED | OUTPATIENT
Start: 2023-06-07

## 2023-06-07 RX ORDER — LISINOPRIL AND HYDROCHLOROTHIAZIDE 20; 12.5 MG/1; MG/1
2 TABLET ORAL DAILY
Qty: 180 TABLET | Refills: 0 | Status: SHIPPED | OUTPATIENT
Start: 2023-06-07

## 2023-06-07 RX ORDER — MELOXICAM 15 MG/1
15 TABLET ORAL DAILY
Qty: 90 TABLET | Refills: 0 | Status: SHIPPED | OUTPATIENT
Start: 2023-06-07

## 2023-06-07 NOTE — PROGRESS NOTES
Patient Office Visit      Patient Name: Maynor Villagomez  : 1960   MRN: [id number]     Chief Complaint:    Chief Complaint   Patient presents with    Hypertension    Arthritis    Carpal Tunnel       History of Present Illness: Maynor Villagomez is a 62 y.o. male who is here today needing medication refills.  He has been taking his medication for his blood pressure but has been out of his meloxicam.  The meloxicam really helps his arthritis and he says his blood pressure does go up when he does not have.  He is overdue for follow-up and labs.  He has hemochromatosis and has not had his therapeutic phlebotomy in 2 or 3 months.  Notes that Dr. Castillo left the practice and does not know who if anyone he also has taken over.  He says he is work [employer] all of his life.  He has bilateral carpal tunnel but is afraid of surgery.    Subjective      Review of Systems:   Review of Systems   Respiratory:  Negative for shortness of breath.    Cardiovascular:  Negative for chest pain, palpitations and leg swelling.      Past Medical History:   Past Medical History:   Diagnosis Date    Arthritis     Bilateral carpal tunnel syndrome 2016    Depression     Generalized erosion of teeth     Hemochromatosis 2022    History of alcohol abuse     History of carpal tunnel syndrome     History of hepatitis     History of nicotine dependence     History of trigeminal neuralgia     Primary hypertension 2007    Right trigeminal neuralgia 2016    Spinal stenosis in cervical region 2016    Tobacco use 10/02/2019       Past Surgical History:   Past Surgical History:   Procedure Laterality Date    ARM WOUND REPAIR / CLOSURE Right     MOUTH SURGERY      Tooth Extraction       Family History:   Family History   Problem Relation Age of Onset    Diabetes type II Mother     Heart disease Father        Social History:   Social History     Socioeconomic History    Marital status:    Tobacco Use     "Smoking status: Every Day     Packs/day: 1.00     Years: 46.00     Pack years: 46.00     Types: Cigarettes     Start date: 1976    Smokeless tobacco: Never   Vaping Use    Vaping Use: Never used   Substance and Sexual Activity    Alcohol use: No    Drug use: No    Sexual activity: Yes       Allergies:   Allergies   Allergen Reactions    Amoxicillin Anaphylaxis    Cephalexin Anaphylaxis    Penicillins Anaphylaxis       Objective     Physical Exam:  Vital Signs:   Vitals:    06/07/23 0836   BP: 162/94   BP Location: Right arm   Patient Position: Sitting   Cuff Size: Adult   Pulse: 84   Resp: 15   Temp: 98 °F (36.7 °C)   TempSrc: Temporal   SpO2: 96%   Weight: 85.5 kg (188 lb 8 oz)   Height: 177.8 cm (70\")     Body mass index is 27.05 kg/m².        Physical Exam  Constitutional:       Appearance: He is normal weight.   Cardiovascular:      Rate and Rhythm: Normal rate and regular rhythm.   Pulmonary:      Effort: Pulmonary effort is normal.      Breath sounds: Normal breath sounds.   Neurological:      General: No focal deficit present.   Psychiatric:         Thought Content: Thought content normal.         Judgment: Judgment normal.       Procedures    Assessment / Plan      Assessment/Plan:   Diagnoses and all orders for this visit:    1. Primary hypertension (Primary)  Assessment & Plan:  Continue amlodipine, increase lisinopril hydrochlorothiazide 12/25 to 40/25.  Follow-up in 2 months.    Orders:  -     amLODIPine (NORVASC) 5 MG tablet; Take 1 tablet by mouth Daily.  Dispense: 90 tablet; Refill: 0  -     lisinopril-hydrochlorothiazide (PRINZIDE,ZESTORETIC) 20-12.5 MG per tablet; Take 2 tablets by mouth Daily.  Dispense: 180 tablet; Refill: 0    2. Osteoarthritis of multiple joints, unspecified osteoarthritis type  -     meloxicam (MOBIC) 15 MG tablet; Take 1 tablet by mouth Daily.  Dispense: 90 tablet; Refill: 0    3. Bilateral carpal tunnel syndrome  Assessment & Plan:  I discussed the condition of carpal tunnel " with patient and encouraged him not to wait too long before he considers surgery because doing so can risk permanent loss of function in his hands.      4. Hereditary hemochromatosis  Assessment & Plan:  We called his oncology office and they do have 2 other doctors who have taken over the practice so he will call and get an appointment scheduled.      5. Encounter for immunization  -     Tdap Vaccine Greater Than or Equal To 8yo IM  -     Pneumococcal Conjugate Vaccine 20-Valent (PCV20)    6. General medical exam  -     Comprehensive Metabolic Panel  -     CBC & Differential  -     Vitamin B12  -     Folate  -     Lipid Panel  -     Hemoglobin A1c  -     CK  -     TSH  -     T4, Free  -     HCV Antibody Rfx To Qnt PCR  -     PSA Screen    7. Screening for hyperlipidemia  -     Lipid Panel    8. Screening for diabetes mellitus  -     Hemoglobin A1c    9. Need for hepatitis C screening test  -     HCV Antibody Rfx To Qnt PCR    10. Screening for prostate cancer  -     PSA Screen           Medications:     Current Outpatient Medications:     albuterol sulfate HFA (Ventolin HFA) 108 (90 Base) MCG/ACT inhaler, Inhale 2 puffs Every 4 (Four) Hours As Needed for Wheezing., Disp: 3 g, Rfl: 1    amLODIPine (NORVASC) 5 MG tablet, Take 1 tablet by mouth Daily., Disp: 90 tablet, Rfl: 0    B Complex Vitamins (VITAMIN B COMPLEX PO), Take  by mouth., Disp: , Rfl:     lisinopril-hydrochlorothiazide (PRINZIDE,ZESTORETIC) 20-12.5 MG per tablet, Take 2 tablets by mouth Daily., Disp: 180 tablet, Rfl: 0    meloxicam (MOBIC) 15 MG tablet, Take 1 tablet by mouth Daily., Disp: 90 tablet, Rfl: 0        Follow Up:   Return in about 2 months (around 8/7/2023) for Annual physical.    Tegan Arreaga PA-C   [facility] [place] [facility]

## 2023-06-07 NOTE — ASSESSMENT & PLAN NOTE
We called his oncology office and they do have 2 other doctors who have taken over the practice so he will call and get an appointment scheduled.

## 2023-06-07 NOTE — ASSESSMENT & PLAN NOTE
Continue amlodipine, increase lisinopril hydrochlorothiazide 12/25 to 40/25.  Follow-up in 2 months.

## 2023-06-07 NOTE — ASSESSMENT & PLAN NOTE
I discussed the condition of carpal tunnel with patient and encouraged him not to wait too long before he considers surgery because doing so can risk permanent loss of function in his hands.

## 2023-06-08 LAB
ALBUMIN SERPL-MCNC: 4.3 G/DL (ref 3.8–4.8)
ALBUMIN/GLOB SERPL: 1.7 {RATIO} (ref 1.2–2.2)
ALP SERPL-CCNC: 84 IU/L (ref 44–121)
ALT SERPL-CCNC: 19 IU/L (ref 0–44)
AST SERPL-CCNC: 19 IU/L (ref 0–40)
BASOPHILS # BLD AUTO: 0.1 X10E3/UL (ref 0–0.2)
BASOPHILS NFR BLD AUTO: 1 %
BILIRUB SERPL-MCNC: 0.5 MG/DL (ref 0–1.2)
BUN SERPL-MCNC: 18 MG/DL (ref 8–27)
BUN/CREAT SERPL: 23 (ref 10–24)
CALCIUM SERPL-MCNC: 9.7 MG/DL (ref 8.6–10.2)
CHLORIDE SERPL-SCNC: 102 MMOL/L (ref 96–106)
CHOLEST SERPL-MCNC: 169 MG/DL (ref 100–199)
CK SERPL-CCNC: 108 U/L (ref 41–331)
CO2 SERPL-SCNC: 20 MMOL/L (ref 20–29)
CREAT SERPL-MCNC: 0.78 MG/DL (ref 0.76–1.27)
EGFRCR SERPLBLD CKD-EPI 2021: 101 ML/MIN/1.73
EOSINOPHIL # BLD AUTO: 0.3 X10E3/UL (ref 0–0.4)
EOSINOPHIL NFR BLD AUTO: 2 %
ERYTHROCYTE [DISTWIDTH] IN BLOOD BY AUTOMATED COUNT: 11.9 % (ref 11.6–15.4)
FOLATE SERPL-MCNC: >20 NG/ML
GLOBULIN SER CALC-MCNC: 2.5 G/DL (ref 1.5–4.5)
GLUCOSE SERPL-MCNC: 93 MG/DL (ref 70–99)
HBA1C MFR BLD: 5.7 % (ref 4.8–5.6)
HCT VFR BLD AUTO: 48.1 % (ref 37.5–51)
HCV AB SERPL QL IA: NORMAL
HCV IGG SERPL QL IA: NON REACTIVE
HDLC SERPL-MCNC: 45 MG/DL
HGB BLD-MCNC: 16.6 G/DL (ref 13–17.7)
IMM GRANULOCYTES # BLD AUTO: 0.1 X10E3/UL (ref 0–0.1)
IMM GRANULOCYTES NFR BLD AUTO: 1 %
LDLC SERPL CALC-MCNC: 100 MG/DL (ref 0–99)
LYMPHOCYTES # BLD AUTO: 3.3 X10E3/UL (ref 0.7–3.1)
LYMPHOCYTES NFR BLD AUTO: 27 %
MCH RBC QN AUTO: 35.2 PG (ref 26.6–33)
MCHC RBC AUTO-ENTMCNC: 34.5 G/DL (ref 31.5–35.7)
MCV RBC AUTO: 102 FL (ref 79–97)
MONOCYTES # BLD AUTO: 1 X10E3/UL (ref 0.1–0.9)
MONOCYTES NFR BLD AUTO: 8 %
NEUTROPHILS # BLD AUTO: 7.6 X10E3/UL (ref 1.4–7)
NEUTROPHILS NFR BLD AUTO: 61 %
PLATELET # BLD AUTO: 221 X10E3/UL (ref 150–450)
POTASSIUM SERPL-SCNC: 4.2 MMOL/L (ref 3.5–5.2)
PROT SERPL-MCNC: 6.8 G/DL (ref 6–8.5)
PSA SERPL-MCNC: 0.6 NG/ML (ref 0–4)
RBC # BLD AUTO: 4.72 X10E6/UL (ref 4.14–5.8)
SODIUM SERPL-SCNC: 139 MMOL/L (ref 134–144)
T4 FREE SERPL-MCNC: 0.97 NG/DL (ref 0.82–1.77)
TRIGL SERPL-MCNC: 132 MG/DL (ref 0–149)
TSH SERPL DL<=0.005 MIU/L-ACNC: 1.48 UIU/ML (ref 0.45–4.5)
VIT B12 SERPL-MCNC: 565 PG/ML (ref 232–1245)
VLDLC SERPL CALC-MCNC: 24 MG/DL (ref 5–40)
WBC # BLD AUTO: 12.4 X10E3/UL (ref 3.4–10.8)

## 2023-06-08 RX ORDER — ALBUTEROL SULFATE 90 UG/1
AEROSOL, METERED RESPIRATORY (INHALATION)
Qty: 18 G | Refills: 0 | Status: SHIPPED | OUTPATIENT
Start: 2023-06-08

## 2023-06-08 NOTE — TELEPHONE ENCOUNTER
Rx Refill Note    Requested Prescriptions     Pending Prescriptions Disp Refills    albuterol sulfate  (90 Base) MCG/ACT inhaler [Pharmacy Med Name: Albuterol Sulfate  (90 Base) MCG/ACT Inhalation Aerosol Solution] 18 g 0     Sig: INHALE 2 PUFFS BY MOUTH EVERY 4 HOURS AS NEEDED FOR WHEEZING        Last office visit with prescribing clinician: 4/22/2022      Next office visit with prescribing clinician: 8/4/2023   Last labs:   Last refill:    Pharmacy [facility]

## 2023-08-04 ENCOUNTER — OFFICE VISIT (OUTPATIENT)
Dept: FAMILY MEDICINE CLINIC | Facility: CLINIC | Age: 63
End: 2023-08-04
Payer: COMMERCIAL

## 2023-08-04 VITALS
DIASTOLIC BLOOD PRESSURE: 94 MMHG | RESPIRATION RATE: 16 BRPM | BODY MASS INDEX: 27.72 KG/M2 | HEART RATE: 68 BPM | WEIGHT: 193.6 LBS | HEIGHT: 70 IN | OXYGEN SATURATION: 98 % | SYSTOLIC BLOOD PRESSURE: 152 MMHG

## 2023-08-04 DIAGNOSIS — I10 PRIMARY HYPERTENSION: ICD-10-CM

## 2023-08-04 DIAGNOSIS — M25.511 CHRONIC RIGHT SHOULDER PAIN: ICD-10-CM

## 2023-08-04 DIAGNOSIS — G89.29 CHRONIC RIGHT SHOULDER PAIN: ICD-10-CM

## 2023-08-04 DIAGNOSIS — F43.29 ADJUSTMENT DISORDER WITH OTHER SYMPTOM: ICD-10-CM

## 2023-08-04 DIAGNOSIS — Z00.00 PHYSICAL EXAM: Primary | ICD-10-CM

## 2023-08-04 DIAGNOSIS — E83.110 HEREDITARY HEMOCHROMATOSIS: ICD-10-CM

## 2023-08-04 DIAGNOSIS — Z12.11 SCREENING FOR MALIGNANT NEOPLASM OF COLON: ICD-10-CM

## 2023-08-04 DIAGNOSIS — Z72.0 TOBACCO USE: ICD-10-CM

## 2023-08-04 DIAGNOSIS — R73.9 HYPERGLYCEMIA: ICD-10-CM

## 2023-08-04 PROBLEM — F43.20 ADJUSTMENT DISORDER: Status: ACTIVE | Noted: 2023-08-04

## 2023-09-19 DIAGNOSIS — M15.9 OSTEOARTHRITIS OF MULTIPLE JOINTS, UNSPECIFIED OSTEOARTHRITIS TYPE: ICD-10-CM

## 2023-09-19 DIAGNOSIS — I10 PRIMARY HYPERTENSION: ICD-10-CM

## 2023-09-19 RX ORDER — ALBUTEROL SULFATE 90 UG/1
AEROSOL, METERED RESPIRATORY (INHALATION)
Qty: 18 G | Refills: 0 | Status: SHIPPED | OUTPATIENT
Start: 2023-09-19

## 2023-09-19 RX ORDER — MELOXICAM 15 MG/1
TABLET ORAL
Qty: 90 TABLET | Refills: 0 | Status: SHIPPED | OUTPATIENT
Start: 2023-09-19

## 2023-09-19 RX ORDER — LISINOPRIL AND HYDROCHLOROTHIAZIDE 20; 12.5 MG/1; MG/1
TABLET ORAL
Qty: 180 TABLET | Refills: 0 | Status: SHIPPED | OUTPATIENT
Start: 2023-09-19

## 2023-09-19 RX ORDER — AMLODIPINE BESYLATE 5 MG/1
TABLET ORAL
Qty: 90 TABLET | Refills: 0 | Status: SHIPPED | OUTPATIENT
Start: 2023-09-19

## 2023-09-19 NOTE — TELEPHONE ENCOUNTER
Rx Refill Note    Requested Prescriptions     Pending Prescriptions Disp Refills    albuterol sulfate  (90 Base) MCG/ACT inhaler [Pharmacy Med Name: Albuterol Sulfate  (90 Base) MCG/ACT Inhalation Aerosol Solution] 18 g 0     Sig: INHALE 2 PUFFS BY MOUTH EVERY 4 HOURS AS NEEDED FOR WHEEZING        Last office visit with prescribing clinician: 8/4/2023      Next office visit with prescribing clinician: 2/13/2024   Last labs:   Last refill: 06/08/2023   Pharmacy (be sure to add in Epic). correct

## 2023-09-19 NOTE — TELEPHONE ENCOUNTER
Rx Refill Note    Requested Prescriptions     Pending Prescriptions Disp Refills    meloxicam (MOBIC) 15 MG tablet [Pharmacy Med Name: Meloxicam 15 MG Oral Tablet] 90 tablet 0     Sig: Take 1 tablet by mouth once daily    lisinopril-hydrochlorothiazide (PRINZIDE,ZESTORETIC) 20-12.5 MG per tablet [Pharmacy Med Name: Lisinopril-hydroCHLOROthiazide 20-12.5 MG Oral Tablet] 180 tablet 0     Sig: Take 2 tablets by mouth once daily    amLODIPine (NORVASC) 5 MG tablet [Pharmacy Med Name: amLODIPine Besylate 5 MG Oral Tablet] 90 tablet 0     Sig: Take 1 tablet by mouth once daily        Last office visit with prescribing clinician: 6/7/2023      Next office visit with prescribing clinician: Visit date not found   Last labs:   Last refill: 06/07/2023   Pharmacy (be sure to add in Epic). correct

## 2023-12-14 DIAGNOSIS — I10 PRIMARY HYPERTENSION: ICD-10-CM

## 2023-12-14 DIAGNOSIS — M15.9 OSTEOARTHRITIS OF MULTIPLE JOINTS, UNSPECIFIED OSTEOARTHRITIS TYPE: ICD-10-CM

## 2023-12-14 RX ORDER — MELOXICAM 15 MG/1
TABLET ORAL
Qty: 30 TABLET | Refills: 0 | Status: SHIPPED | OUTPATIENT
Start: 2023-12-14

## 2023-12-14 RX ORDER — LISINOPRIL AND HYDROCHLOROTHIAZIDE 20; 12.5 MG/1; MG/1
TABLET ORAL
Qty: 60 TABLET | Refills: 0 | Status: SHIPPED | OUTPATIENT
Start: 2023-12-14

## 2023-12-14 RX ORDER — AMLODIPINE BESYLATE 5 MG/1
TABLET ORAL
Qty: 30 TABLET | Refills: 0 | Status: SHIPPED | OUTPATIENT
Start: 2023-12-14

## 2023-12-14 NOTE — TELEPHONE ENCOUNTER
Rx Refill Note    Requested Prescriptions     Pending Prescriptions Disp Refills    meloxicam (MOBIC) 15 MG tablet [Pharmacy Med Name: Meloxicam 15 MG Oral Tablet] 30 tablet 0     Sig: Take 1 tablet by mouth once daily    lisinopril-hydrochlorothiazide (PRINZIDE,ZESTORETIC) 20-12.5 MG per tablet [Pharmacy Med Name: Lisinopril-hydroCHLOROthiazide 20-12.5 MG Oral Tablet] 60 tablet 0     Sig: Take 2 tablets by mouth once daily    amLODIPine (NORVASC) 5 MG tablet [Pharmacy Med Name: amLODIPine Besylate 5 MG Oral Tablet] 30 tablet 0     Sig: Take 1 tablet by mouth once daily        Last office visit with prescribing clinician: 8/4/2023      Next office visit with prescribing clinician: 2/13/2024   Last labs:   Last refill: 09/19/2023   Pharmacy (be sure to add in Epic). correct

## 2024-01-13 DIAGNOSIS — I10 PRIMARY HYPERTENSION: ICD-10-CM

## 2024-01-13 DIAGNOSIS — M15.9 OSTEOARTHRITIS OF MULTIPLE JOINTS, UNSPECIFIED OSTEOARTHRITIS TYPE: ICD-10-CM

## 2024-01-16 RX ORDER — LISINOPRIL AND HYDROCHLOROTHIAZIDE 20; 12.5 MG/1; MG/1
TABLET ORAL
Qty: 60 TABLET | Refills: 0 | Status: SHIPPED | OUTPATIENT
Start: 2024-01-16

## 2024-01-16 RX ORDER — MELOXICAM 15 MG/1
TABLET ORAL
Qty: 30 TABLET | Refills: 0 | Status: SHIPPED | OUTPATIENT
Start: 2024-01-16

## 2024-01-16 RX ORDER — AMLODIPINE BESYLATE 5 MG/1
TABLET ORAL
Qty: 30 TABLET | Refills: 0 | Status: SHIPPED | OUTPATIENT
Start: 2024-01-16

## 2024-02-05 ENCOUNTER — TELEPHONE (OUTPATIENT)
Dept: FAMILY MEDICINE CLINIC | Facility: CLINIC | Age: 64
End: 2024-02-05
Payer: COMMERCIAL

## 2024-02-05 ENCOUNTER — LAB (OUTPATIENT)
Dept: FAMILY MEDICINE CLINIC | Facility: CLINIC | Age: 64
End: 2024-02-05
Payer: COMMERCIAL

## 2024-02-05 DIAGNOSIS — G89.29 CHRONIC RIGHT SHOULDER PAIN: ICD-10-CM

## 2024-02-05 DIAGNOSIS — Z00.00 PHYSICAL EXAM: ICD-10-CM

## 2024-02-05 DIAGNOSIS — E83.110 HEREDITARY HEMOCHROMATOSIS: ICD-10-CM

## 2024-02-05 DIAGNOSIS — M25.511 CHRONIC RIGHT SHOULDER PAIN: ICD-10-CM

## 2024-02-05 DIAGNOSIS — I10 PRIMARY HYPERTENSION: ICD-10-CM

## 2024-02-05 DIAGNOSIS — F43.29 ADJUSTMENT DISORDER WITH OTHER SYMPTOM: ICD-10-CM

## 2024-02-05 DIAGNOSIS — Z12.11 SCREENING FOR MALIGNANT NEOPLASM OF COLON: ICD-10-CM

## 2024-02-05 DIAGNOSIS — R73.9 HYPERGLYCEMIA: ICD-10-CM

## 2024-02-05 DIAGNOSIS — Z72.0 TOBACCO USE: ICD-10-CM

## 2024-02-05 PROCEDURE — 36415 COLL VENOUS BLD VENIPUNCTURE: CPT | Performed by: FAMILY MEDICINE

## 2024-02-05 NOTE — TELEPHONE ENCOUNTER
Incoming Refill Request      Medication requested (name and dose): ALBUTEROL SULFATE  (90 BASE) MCG/ACT INHALER    Pharmacy where request should be sent: [facility]    Additional details provided by patient: PT WALKED IN STATING HE WAS NEEDING A NEW INHALER SENT IN     Best call back number: [phone]    Does the patient have less than a 3 day supply:  [x] Yes  [] No    Kirsten Cope Rep  02/05/24, 08:13 EST

## 2024-02-06 LAB
25(OH)D3+25(OH)D2 SERPL-MCNC: 21 NG/ML (ref 30–100)
ALBUMIN SERPL-MCNC: 4.3 G/DL (ref 3.9–4.9)
ALBUMIN/GLOB SERPL: 1.6 {RATIO} (ref 1.2–2.2)
ALP SERPL-CCNC: 68 IU/L (ref 44–121)
ALT SERPL-CCNC: 20 IU/L (ref 0–44)
AST SERPL-CCNC: 15 IU/L (ref 0–40)
BASOPHILS # BLD AUTO: 0.1 X10E3/UL (ref 0–0.2)
BASOPHILS NFR BLD AUTO: 1 %
BILIRUB SERPL-MCNC: 0.4 MG/DL (ref 0–1.2)
BUN SERPL-MCNC: 23 MG/DL (ref 8–27)
BUN/CREAT SERPL: 29 (ref 10–24)
CALCIUM SERPL-MCNC: 9.6 MG/DL (ref 8.6–10.2)
CHLORIDE SERPL-SCNC: 102 MMOL/L (ref 96–106)
CHOLEST SERPL-MCNC: 180 MG/DL (ref 100–199)
CO2 SERPL-SCNC: 23 MMOL/L (ref 20–29)
CREAT SERPL-MCNC: 0.8 MG/DL (ref 0.76–1.27)
EGFRCR SERPLBLD CKD-EPI 2021: 99 ML/MIN/1.73
EOSINOPHIL # BLD AUTO: 0.4 X10E3/UL (ref 0–0.4)
EOSINOPHIL NFR BLD AUTO: 4 %
ERYTHROCYTE [DISTWIDTH] IN BLOOD BY AUTOMATED COUNT: 11.7 % (ref 11.6–15.4)
GLOBULIN SER CALC-MCNC: 2.7 G/DL (ref 1.5–4.5)
GLUCOSE SERPL-MCNC: 99 MG/DL (ref 70–99)
HBA1C MFR BLD: 5.8 % (ref 4.8–5.6)
HCT VFR BLD AUTO: 46.4 % (ref 37.5–51)
HDLC SERPL-MCNC: 42 MG/DL
HGB BLD-MCNC: 16.2 G/DL (ref 13–17.7)
IMM GRANULOCYTES # BLD AUTO: 0.1 X10E3/UL (ref 0–0.1)
IMM GRANULOCYTES NFR BLD AUTO: 1 %
LDLC SERPL CALC-MCNC: 121 MG/DL (ref 0–99)
LYMPHOCYTES # BLD AUTO: 3.1 X10E3/UL (ref 0.7–3.1)
LYMPHOCYTES NFR BLD AUTO: 34 %
MCH RBC QN AUTO: 35.2 PG (ref 26.6–33)
MCHC RBC AUTO-ENTMCNC: 34.9 G/DL (ref 31.5–35.7)
MCV RBC AUTO: 101 FL (ref 79–97)
MONOCYTES # BLD AUTO: 0.9 X10E3/UL (ref 0.1–0.9)
MONOCYTES NFR BLD AUTO: 10 %
NEUTROPHILS # BLD AUTO: 4.6 X10E3/UL (ref 1.4–7)
NEUTROPHILS NFR BLD AUTO: 50 %
PLATELET # BLD AUTO: 240 X10E3/UL (ref 150–450)
POTASSIUM SERPL-SCNC: 4.7 MMOL/L (ref 3.5–5.2)
PROT SERPL-MCNC: 7 G/DL (ref 6–8.5)
RBC # BLD AUTO: 4.6 X10E6/UL (ref 4.14–5.8)
SODIUM SERPL-SCNC: 139 MMOL/L (ref 134–144)
TRIGL SERPL-MCNC: 95 MG/DL (ref 0–149)
TSH SERPL DL<=0.005 MIU/L-ACNC: 1.46 UIU/ML (ref 0.45–4.5)
VIT B12 SERPL-MCNC: 739 PG/ML (ref 232–1245)
VLDLC SERPL CALC-MCNC: 17 MG/DL (ref 5–40)
WBC # BLD AUTO: 9.1 X10E3/UL (ref 3.4–10.8)

## 2024-02-06 RX ORDER — ALBUTEROL SULFATE 90 UG/1
2 AEROSOL, METERED RESPIRATORY (INHALATION) EVERY 4 HOURS PRN
Qty: 18 G | Refills: 0 | Status: SHIPPED | OUTPATIENT
Start: 2024-02-06

## 2024-02-13 ENCOUNTER — OFFICE VISIT (OUTPATIENT)
Dept: FAMILY MEDICINE CLINIC | Facility: CLINIC | Age: 64
End: 2024-02-13
Payer: COMMERCIAL

## 2024-02-13 VITALS
OXYGEN SATURATION: 98 % | HEIGHT: 70 IN | DIASTOLIC BLOOD PRESSURE: 78 MMHG | SYSTOLIC BLOOD PRESSURE: 132 MMHG | WEIGHT: 196.8 LBS | RESPIRATION RATE: 18 BRPM | BODY MASS INDEX: 28.18 KG/M2 | HEART RATE: 81 BPM

## 2024-02-13 DIAGNOSIS — M15.9 OSTEOARTHRITIS OF MULTIPLE JOINTS, UNSPECIFIED OSTEOARTHRITIS TYPE: ICD-10-CM

## 2024-02-13 DIAGNOSIS — E78.2 HYPERLIPIDEMIA, MIXED: ICD-10-CM

## 2024-02-13 DIAGNOSIS — Z12.11 SCREENING FOR MALIGNANT NEOPLASM OF COLON: ICD-10-CM

## 2024-02-13 DIAGNOSIS — F43.20 ADJUSTMENT DISORDER, UNSPECIFIED TYPE: ICD-10-CM

## 2024-02-13 DIAGNOSIS — Z12.11 SCREEN FOR COLON CANCER: ICD-10-CM

## 2024-02-13 DIAGNOSIS — Z87.891 PERSONAL HISTORY OF TOBACCO USE, PRESENTING HAZARDS TO HEALTH: ICD-10-CM

## 2024-02-13 DIAGNOSIS — I10 PRIMARY HYPERTENSION: ICD-10-CM

## 2024-02-13 DIAGNOSIS — Z12.5 SCREENING FOR PROSTATE CANCER: ICD-10-CM

## 2024-02-13 DIAGNOSIS — Z72.0 TOBACCO USE: ICD-10-CM

## 2024-02-13 DIAGNOSIS — R73.9 HYPERGLYCEMIA: ICD-10-CM

## 2024-02-13 DIAGNOSIS — D75.89 MACROCYTOSIS: ICD-10-CM

## 2024-02-13 DIAGNOSIS — I10 PRIMARY HYPERTENSION: Primary | ICD-10-CM

## 2024-02-13 DIAGNOSIS — E55.9 VITAMIN D DEFICIENCY: ICD-10-CM

## 2024-02-13 RX ORDER — AMLODIPINE BESYLATE 5 MG/1
TABLET ORAL
Qty: 30 TABLET | Refills: 0 | OUTPATIENT
Start: 2024-02-13

## 2024-02-13 RX ORDER — LISINOPRIL AND HYDROCHLOROTHIAZIDE 20; 12.5 MG/1; MG/1
2 TABLET ORAL DAILY
Qty: 180 TABLET | Refills: 0 | Status: SHIPPED | OUTPATIENT
Start: 2024-02-13

## 2024-02-13 RX ORDER — MELOXICAM 15 MG/1
TABLET ORAL
Qty: 30 TABLET | Refills: 0 | OUTPATIENT
Start: 2024-02-13

## 2024-02-13 RX ORDER — LISINOPRIL AND HYDROCHLOROTHIAZIDE 20; 12.5 MG/1; MG/1
TABLET ORAL
Qty: 60 TABLET | Refills: 0 | OUTPATIENT
Start: 2024-02-13

## 2024-02-13 RX ORDER — MELOXICAM 15 MG/1
15 TABLET ORAL DAILY
Qty: 90 TABLET | Refills: 0 | Status: SHIPPED | OUTPATIENT
Start: 2024-02-13

## 2024-02-13 RX ORDER — ROSUVASTATIN CALCIUM 10 MG/1
10 TABLET, COATED ORAL DAILY
Qty: 90 TABLET | Refills: 1 | Status: SHIPPED | OUTPATIENT
Start: 2024-02-13

## 2024-02-13 RX ORDER — AMLODIPINE BESYLATE 5 MG/1
5 TABLET ORAL DAILY
Qty: 90 TABLET | Refills: 0 | Status: SHIPPED | OUTPATIENT
Start: 2024-02-13

## 2024-03-26 ENCOUNTER — OFFICE VISIT (OUTPATIENT)
Dept: FAMILY MEDICINE CLINIC | Facility: CLINIC | Age: 64
End: 2024-03-26
Payer: COMMERCIAL

## 2024-03-26 VITALS
HEART RATE: 78 BPM | OXYGEN SATURATION: 98 % | SYSTOLIC BLOOD PRESSURE: 134 MMHG | WEIGHT: 193.8 LBS | BODY MASS INDEX: 27.75 KG/M2 | RESPIRATION RATE: 16 BRPM | HEIGHT: 70 IN | DIASTOLIC BLOOD PRESSURE: 80 MMHG

## 2024-03-26 DIAGNOSIS — F43.20 ADJUSTMENT DISORDER, UNSPECIFIED TYPE: ICD-10-CM

## 2024-03-26 DIAGNOSIS — I10 PRIMARY HYPERTENSION: ICD-10-CM

## 2024-03-26 DIAGNOSIS — J40 BRONCHITIS: Primary | ICD-10-CM

## 2024-03-26 DIAGNOSIS — Z87.891 PERSONAL HISTORY OF TOBACCO USE, PRESENTING HAZARDS TO HEALTH: ICD-10-CM

## 2024-03-26 PROCEDURE — 99214 OFFICE O/P EST MOD 30 MIN: CPT | Performed by: FAMILY MEDICINE

## 2024-03-26 RX ORDER — LEVOFLOXACIN 250 MG/1
250 TABLET, FILM COATED ORAL DAILY
Qty: 10 TABLET | Refills: 0 | Status: SHIPPED | OUTPATIENT
Start: 2024-03-26

## 2024-03-26 RX ORDER — DEXTROMETHORPHAN HYDROBROMIDE AND PROMETHAZINE HYDROCHLORIDE 15; 6.25 MG/5ML; MG/5ML
5 SYRUP ORAL 4 TIMES DAILY PRN
Qty: 180 ML | Refills: 0 | Status: SHIPPED | OUTPATIENT
Start: 2024-03-26

## 2024-03-26 NOTE — ASSESSMENT & PLAN NOTE
Discussed with patient to monitor their blood pressure and if systolic blood pressure goes above 140 or diastolic is above 90 to return to clinic.  Take medicines as directed, call for any problems, patient not having or any worrisome symptoms.      
I am going to get a chest x-ray.  Start him on Levaquin.  He is to continue inhaler at home and I will call in some cough syrup.  
Low-dose CT shows some mild and formation in his lungs.  Repeat 1 year.  
Patient and his wife are doing better.  She is still not gambling.  
admission

## 2024-03-26 NOTE — PROGRESS NOTES
Patient Name: Maynor Villagomez  : 1960   MRN: [id number]     Chief Complaint:    Chief Complaint   Patient presents with    Nasal Congestion    Cough    Wheezing     Onset two weeks        History of Present Illness: Maynor Villagomez is a 63 y.o. male who is here today for follow up with cough and congestion  Cough  Associated symptoms include wheezing.   Wheezing   Associated symptoms include coughing.           Review of Systems:   Review of Systems   Constitutional: Negative.    HENT: Negative.     Eyes: Negative.    Respiratory:  Positive for cough and wheezing.    Cardiovascular: Negative.    Gastrointestinal: Negative.    Neurological: Negative.         Past Medical History:   Past Medical History:   Diagnosis Date    Arthritis     Bilateral carpal tunnel syndrome 2016    Depression     Generalized erosion of teeth     Hemochromatosis 2022    History of alcohol abuse     History of carpal tunnel syndrome     History of hepatitis     History of nicotine dependence     History of trigeminal neuralgia     Primary hypertension 2007    Right trigeminal neuralgia 2016    Spinal stenosis in cervical region 2016    Tobacco use 10/02/2019       Past Surgical History:   Past Surgical History:   Procedure Laterality Date    ARM WOUND REPAIR / CLOSURE Right     MOUTH SURGERY      Tooth Extraction       Family History:   Family History   Problem Relation Age of Onset    Diabetes type II Mother     Heart disease Father        Social History:   Social History     Socioeconomic History    Marital status:    Tobacco Use    Smoking status: Every Day     Current packs/day: 1.00     Average packs/day: 1 pack/day for 48.2 years (48.2 ttl pk-yrs)     Types: Cigarettes     Start date:     Smokeless tobacco: Never   Vaping Use    Vaping status: Never Used   Substance and Sexual Activity    Alcohol use: No    Drug use: No    Sexual activity: Yes       Medications:     Current  "Outpatient Medications:     albuterol sulfate  (90 Base) MCG/ACT inhaler, Inhale 2 puffs Every 4 (Four) Hours As Needed for Wheezing., Disp: 18 g, Rfl: 0    amLODIPine (NORVASC) 5 MG tablet, Take 1 tablet by mouth Daily., Disp: 90 tablet, Rfl: 0    B Complex Vitamins (VITAMIN B COMPLEX PO), Take  by mouth., Disp: , Rfl:     lisinopril-hydrochlorothiazide (PRINZIDE,ZESTORETIC) 20-12.5 MG per tablet, Take 2 tablets by mouth Daily., Disp: 180 tablet, Rfl: 0    meloxicam (MOBIC) 15 MG tablet, Take 1 tablet by mouth Daily., Disp: 90 tablet, Rfl: 0    rosuvastatin (CRESTOR) 10 MG tablet, Take 1 tablet by mouth Daily., Disp: 90 tablet, Rfl: 1    levoFLOXacin (Levaquin) 250 MG tablet, Take 1 tablet by mouth Daily., Disp: 10 tablet, Rfl: 0    promethazine-dextromethorphan (PROMETHAZINE-DM) 6.25-15 MG/5ML syrup, Take 5 mL by mouth 4 (Four) Times a Day As Needed for Cough., Disp: 180 mL, Rfl: 0    Allergies:   Allergies   Allergen Reactions    Amoxicillin Anaphylaxis    Cephalexin Anaphylaxis    Penicillins Anaphylaxis         Physical Exam:  Vital Signs:   Vitals:    03/26/24 1037   BP: 134/80   BP Location: Left arm   Patient Position: Sitting   Cuff Size: Adult   Pulse: 78   Resp: 16   SpO2: 98%   Weight: 87.9 kg (193 lb 12.8 oz)   Height: 177.8 cm (70\")   PainSc: 0-No pain     Body mass index is 27.81 kg/m².     Physical Exam  Vitals and nursing note reviewed.   Constitutional:       Appearance: Normal appearance. He is normal weight.   HENT:      Head: Normocephalic and atraumatic.      Right Ear: Tympanic membrane, ear canal and external ear normal.      Left Ear: Tympanic membrane, ear canal and external ear normal.      Nose: Nose normal.      Mouth/Throat:      Mouth: Mucous membranes are dry.      Pharynx: Oropharynx is clear.   Eyes:      Extraocular Movements: Extraocular movements intact.      Conjunctiva/sclera: Conjunctivae normal.      Pupils: Pupils are equal, round, and reactive to light. "   Cardiovascular:      Rate and Rhythm: Normal rate and regular rhythm.      Pulses: Normal pulses.      Heart sounds: Normal heart sounds.   Pulmonary:      Effort: Pulmonary effort is normal.      Breath sounds: Normal breath sounds.   Musculoskeletal:      Cervical back: Normal range of motion and neck supple.   Feet:      Comments:      Neurological:      Mental Status: He is alert.         Procedures      Assessment/Plan:   Diagnoses and all orders for this visit:    1. Bronchitis (Primary)  Assessment & Plan:  I am going to get a chest x-ray.  Start him on Levaquin.  He is to continue inhaler at home and I will call in some cough syrup.    Orders:  -     XR Chest 2 View; Future    2. Personal history of tobacco use, presenting hazards to health  Assessment & Plan:  Low-dose CT shows some mild and formation in his lungs.  Repeat 1 year.      3. Adjustment disorder, unspecified type  Assessment & Plan:  Patient and his wife are doing better.  She is still not gambling.      4. Primary hypertension  Assessment & Plan:  Discussed with patient to monitor their blood pressure and if systolic blood pressure goes above 140 or diastolic is above 90 to return to clinic.  Take medicines as directed, call for any problems, patient not having or any worrisome symptoms.          Other orders  -     promethazine-dextromethorphan (PROMETHAZINE-DM) 6.25-15 MG/5ML syrup; Take 5 mL by mouth 4 (Four) Times a Day As Needed for Cough.  Dispense: 180 mL; Refill: 0  -     levoFLOXacin (Levaquin) 250 MG tablet; Take 1 tablet by mouth Daily.  Dispense: 10 tablet; Refill: 0             Follow Up:   No follow-ups on file.      Case Borden MD  [facility]

## 2024-05-10 DIAGNOSIS — I10 PRIMARY HYPERTENSION: ICD-10-CM

## 2024-05-10 DIAGNOSIS — M15.9 OSTEOARTHRITIS OF MULTIPLE JOINTS, UNSPECIFIED OSTEOARTHRITIS TYPE: ICD-10-CM

## 2024-05-10 RX ORDER — LISINOPRIL AND HYDROCHLOROTHIAZIDE 20; 12.5 MG/1; MG/1
2 TABLET ORAL DAILY
Qty: 180 TABLET | Refills: 0 | Status: SHIPPED | OUTPATIENT
Start: 2024-05-10

## 2024-05-10 RX ORDER — AMLODIPINE BESYLATE 5 MG/1
5 TABLET ORAL DAILY
Qty: 90 TABLET | Refills: 0 | Status: SHIPPED | OUTPATIENT
Start: 2024-05-10

## 2024-05-10 RX ORDER — MELOXICAM 15 MG/1
15 TABLET ORAL DAILY
Qty: 90 TABLET | Refills: 0 | Status: SHIPPED | OUTPATIENT
Start: 2024-05-10

## 2024-06-17 RX ORDER — ALBUTEROL SULFATE 90 UG/1
2 AEROSOL, METERED RESPIRATORY (INHALATION) EVERY 4 HOURS PRN
Qty: 18 G | Refills: 0 | Status: SHIPPED | OUTPATIENT
Start: 2024-06-17

## 2024-08-06 ENCOUNTER — LAB (OUTPATIENT)
Dept: FAMILY MEDICINE CLINIC | Facility: CLINIC | Age: 64
End: 2024-08-06
Payer: COMMERCIAL

## 2024-08-06 DIAGNOSIS — F43.20 ADJUSTMENT DISORDER, UNSPECIFIED TYPE: ICD-10-CM

## 2024-08-06 DIAGNOSIS — I10 PRIMARY HYPERTENSION: ICD-10-CM

## 2024-08-06 DIAGNOSIS — Z72.0 TOBACCO USE: ICD-10-CM

## 2024-08-06 DIAGNOSIS — M15.9 OSTEOARTHRITIS OF MULTIPLE JOINTS, UNSPECIFIED OSTEOARTHRITIS TYPE: ICD-10-CM

## 2024-08-06 DIAGNOSIS — Z12.5 SCREENING FOR PROSTATE CANCER: ICD-10-CM

## 2024-08-06 DIAGNOSIS — E55.9 VITAMIN D DEFICIENCY: ICD-10-CM

## 2024-08-06 DIAGNOSIS — Z87.891 PERSONAL HISTORY OF TOBACCO USE, PRESENTING HAZARDS TO HEALTH: ICD-10-CM

## 2024-08-06 DIAGNOSIS — E78.2 HYPERLIPIDEMIA, MIXED: ICD-10-CM

## 2024-08-06 DIAGNOSIS — Z12.11 SCREEN FOR COLON CANCER: ICD-10-CM

## 2024-08-06 DIAGNOSIS — D75.89 MACROCYTOSIS: ICD-10-CM

## 2024-08-06 DIAGNOSIS — R73.9 HYPERGLYCEMIA: ICD-10-CM

## 2024-08-06 DIAGNOSIS — Z12.11 SCREENING FOR MALIGNANT NEOPLASM OF COLON: ICD-10-CM

## 2024-08-07 LAB
25(OH)D3+25(OH)D2 SERPL-MCNC: 27 NG/ML (ref 30–100)
ALBUMIN SERPL-MCNC: 4.2 G/DL (ref 3.9–4.9)
ALP SERPL-CCNC: 74 IU/L (ref 44–121)
ALT SERPL-CCNC: 22 IU/L (ref 0–44)
AST SERPL-CCNC: 18 IU/L (ref 0–40)
BASOPHILS # BLD AUTO: 0.1 X10E3/UL (ref 0–0.2)
BASOPHILS NFR BLD AUTO: 1 %
BILIRUB SERPL-MCNC: 0.2 MG/DL (ref 0–1.2)
BUN SERPL-MCNC: 26 MG/DL (ref 8–27)
BUN/CREAT SERPL: 30 (ref 10–24)
CALCIUM SERPL-MCNC: 9.7 MG/DL (ref 8.6–10.2)
CHLORIDE SERPL-SCNC: 103 MMOL/L (ref 96–106)
CHOLEST SERPL-MCNC: 174 MG/DL (ref 100–199)
CO2 SERPL-SCNC: 22 MMOL/L (ref 20–29)
CREAT SERPL-MCNC: 0.87 MG/DL (ref 0.76–1.27)
EGFRCR SERPLBLD CKD-EPI 2021: 97 ML/MIN/1.73
EOSINOPHIL # BLD AUTO: 0.3 X10E3/UL (ref 0–0.4)
EOSINOPHIL NFR BLD AUTO: 3 %
ERYTHROCYTE [DISTWIDTH] IN BLOOD BY AUTOMATED COUNT: 12.2 % (ref 11.6–15.4)
GLOBULIN SER CALC-MCNC: 2.3 G/DL (ref 1.5–4.5)
GLUCOSE SERPL-MCNC: 100 MG/DL (ref 70–99)
HBA1C MFR BLD: 5.8 % (ref 4.8–5.6)
HCT VFR BLD AUTO: 47.9 % (ref 37.5–51)
HDLC SERPL-MCNC: 40 MG/DL
HGB BLD-MCNC: 15.5 G/DL (ref 13–17.7)
IMM GRANULOCYTES # BLD AUTO: 0 X10E3/UL (ref 0–0.1)
IMM GRANULOCYTES NFR BLD AUTO: 0 %
LDLC SERPL CALC-MCNC: 109 MG/DL (ref 0–99)
LYMPHOCYTES # BLD AUTO: 3 X10E3/UL (ref 0.7–3.1)
LYMPHOCYTES NFR BLD AUTO: 30 %
MCH RBC QN AUTO: 35.2 PG (ref 26.6–33)
MCHC RBC AUTO-ENTMCNC: 32.4 G/DL (ref 31.5–35.7)
MCV RBC AUTO: 109 FL (ref 79–97)
MONOCYTES # BLD AUTO: 1.3 X10E3/UL (ref 0.1–0.9)
MONOCYTES NFR BLD AUTO: 13 %
NEUTROPHILS # BLD AUTO: 5.4 X10E3/UL (ref 1.4–7)
NEUTROPHILS NFR BLD AUTO: 53 %
PLATELET # BLD AUTO: 180 X10E3/UL (ref 150–450)
POTASSIUM SERPL-SCNC: 4.5 MMOL/L (ref 3.5–5.2)
PROT SERPL-MCNC: 6.5 G/DL (ref 6–8.5)
PSA SERPL-MCNC: 0.5 NG/ML (ref 0–4)
RBC # BLD AUTO: 4.4 X10E6/UL (ref 4.14–5.8)
SODIUM SERPL-SCNC: 139 MMOL/L (ref 134–144)
TRIGL SERPL-MCNC: 143 MG/DL (ref 0–149)
TSH SERPL DL<=0.005 MIU/L-ACNC: 1.36 UIU/ML (ref 0.45–4.5)
VIT B12 SERPL-MCNC: 581 PG/ML (ref 232–1245)
VLDLC SERPL CALC-MCNC: 25 MG/DL (ref 5–40)
WBC # BLD AUTO: 10 X10E3/UL (ref 3.4–10.8)

## 2024-08-10 DIAGNOSIS — M15.9 OSTEOARTHRITIS OF MULTIPLE JOINTS, UNSPECIFIED OSTEOARTHRITIS TYPE: ICD-10-CM

## 2024-08-10 DIAGNOSIS — I10 PRIMARY HYPERTENSION: ICD-10-CM

## 2024-08-10 NOTE — TELEPHONE ENCOUNTER
Rx Refill Note    Requested Prescriptions     Pending Prescriptions Disp Refills    lisinopril-hydrochlorothiazide (PRINZIDE,ZESTORETIC) 20-12.5 MG per tablet [Pharmacy Med Name: Lisinopril-hydroCHLOROthiazide 20-12.5 MG Oral Tablet] 180 tablet 0     Sig: Take 2 tablets by mouth once daily    amLODIPine (NORVASC) 5 MG tablet [Pharmacy Med Name: amLODIPine Besylate 5 MG Oral Tablet] 90 tablet 0     Sig: Take 1 tablet by mouth once daily    meloxicam (MOBIC) 15 MG tablet [Pharmacy Med Name: Meloxicam 15 MG Oral Tablet] 90 tablet 0     Sig: Take 1 tablet by mouth once daily        Last office visit with prescribing clinician: 3/26/2024      Next office visit with prescribing clinician: 8/13/2024   Last labs:   Last refill: needs   Pharmacy (be sure to add in Epic). correct

## 2024-08-12 RX ORDER — LISINOPRIL AND HYDROCHLOROTHIAZIDE 20; 12.5 MG/1; MG/1
2 TABLET ORAL DAILY
Qty: 180 TABLET | Refills: 0 | Status: SHIPPED | OUTPATIENT
Start: 2024-08-12 | End: 2024-08-13 | Stop reason: SDUPTHER

## 2024-08-12 RX ORDER — AMLODIPINE BESYLATE 5 MG/1
5 TABLET ORAL DAILY
Qty: 90 TABLET | Refills: 0 | Status: SHIPPED | OUTPATIENT
Start: 2024-08-12 | End: 2024-08-13 | Stop reason: SDUPTHER

## 2024-08-12 RX ORDER — MELOXICAM 15 MG/1
15 TABLET ORAL DAILY
Qty: 90 TABLET | Refills: 0 | Status: SHIPPED | OUTPATIENT
Start: 2024-08-12

## 2024-08-13 ENCOUNTER — OFFICE VISIT (OUTPATIENT)
Dept: FAMILY MEDICINE CLINIC | Facility: CLINIC | Age: 64
End: 2024-08-13
Payer: COMMERCIAL

## 2024-08-13 VITALS
OXYGEN SATURATION: 98 % | RESPIRATION RATE: 16 BRPM | DIASTOLIC BLOOD PRESSURE: 84 MMHG | BODY MASS INDEX: 27.89 KG/M2 | WEIGHT: 194.8 LBS | HEART RATE: 84 BPM | HEIGHT: 70 IN | SYSTOLIC BLOOD PRESSURE: 138 MMHG

## 2024-08-13 DIAGNOSIS — E78.2 HYPERLIPIDEMIA, MIXED: ICD-10-CM

## 2024-08-13 DIAGNOSIS — E83.119 HEMOCHROMATOSIS, UNSPECIFIED HEMOCHROMATOSIS TYPE: ICD-10-CM

## 2024-08-13 DIAGNOSIS — Z12.11 SCREENING FOR MALIGNANT NEOPLASM OF COLON: ICD-10-CM

## 2024-08-13 DIAGNOSIS — E83.110 HEREDITARY HEMOCHROMATOSIS: ICD-10-CM

## 2024-08-13 DIAGNOSIS — Z00.00 PHYSICAL EXAM: Primary | ICD-10-CM

## 2024-08-13 DIAGNOSIS — R06.02 SHORTNESS OF BREATH: ICD-10-CM

## 2024-08-13 DIAGNOSIS — I10 PRIMARY HYPERTENSION: ICD-10-CM

## 2024-08-13 DIAGNOSIS — F43.20 ADJUSTMENT DISORDER, UNSPECIFIED TYPE: ICD-10-CM

## 2024-08-13 DIAGNOSIS — D75.89 MACROCYTOSIS: ICD-10-CM

## 2024-08-13 DIAGNOSIS — E55.9 VITAMIN D DEFICIENCY: ICD-10-CM

## 2024-08-13 DIAGNOSIS — Z87.891 PERSONAL HISTORY OF TOBACCO USE, PRESENTING HAZARDS TO HEALTH: ICD-10-CM

## 2024-08-13 DIAGNOSIS — R73.9 HYPERGLYCEMIA: ICD-10-CM

## 2024-08-13 PROCEDURE — 99214 OFFICE O/P EST MOD 30 MIN: CPT | Performed by: FAMILY MEDICINE

## 2024-08-13 PROCEDURE — 93000 ELECTROCARDIOGRAM COMPLETE: CPT | Performed by: FAMILY MEDICINE

## 2024-08-13 PROCEDURE — 99396 PREV VISIT EST AGE 40-64: CPT | Performed by: FAMILY MEDICINE

## 2024-08-13 RX ORDER — LISINOPRIL AND HYDROCHLOROTHIAZIDE 20; 12.5 MG/1; MG/1
2 TABLET ORAL DAILY
Qty: 180 TABLET | Refills: 0 | Status: SHIPPED | OUTPATIENT
Start: 2024-08-13

## 2024-08-13 RX ORDER — ALBUTEROL SULFATE 90 UG/1
2 AEROSOL, METERED RESPIRATORY (INHALATION) EVERY 4 HOURS PRN
Qty: 18 G | Refills: 5 | Status: SHIPPED | OUTPATIENT
Start: 2024-08-13

## 2024-08-13 RX ORDER — AMLODIPINE BESYLATE 5 MG/1
5 TABLET ORAL DAILY
Qty: 90 TABLET | Refills: 0 | Status: SHIPPED | OUTPATIENT
Start: 2024-08-13

## 2024-08-13 RX ORDER — ROSUVASTATIN CALCIUM 10 MG/1
10 TABLET, COATED ORAL DAILY
Qty: 90 TABLET | Refills: 1 | Status: SHIPPED | OUTPATIENT
Start: 2024-08-13

## 2024-08-13 NOTE — ASSESSMENT & PLAN NOTE
Patient has been having shortness of breath with exertion.  Does not have any chest pain.  EKG is okay.  I am going to get a stress test.

## 2024-08-13 NOTE — PROGRESS NOTES
Patient Name: Maynor Villagomez  : 1960   MRN: [id number]     Chief Complaint:    Chief Complaint   Patient presents with    oncology referral     Annual Exam       History of Present Illness: Maynor Villagomez is a 63 y.o. male who is here today for their annual health maintenance and physical.           Review of Systems:   Review of Systems   Constitutional: Negative.    HENT: Negative.     Eyes: Negative.    Respiratory: Negative.     Cardiovascular: Negative.    Gastrointestinal: Negative.    Neurological: Negative.        Past Medical History, Social History, Family History and Care Team were all reviewed with patient and updated as appropriate.     Medications:     Current Outpatient Medications:     albuterol sulfate  (90 Base) MCG/ACT inhaler, INHALE 2 PUFFS BY MOUTH EVERY 4 HOURS AS NEEDED FOR WHEEZING, Disp: 18 g, Rfl: 0    amLODIPine (NORVASC) 5 MG tablet, Take 1 tablet by mouth once daily, Disp: 90 tablet, Rfl: 0    B Complex Vitamins (VITAMIN B COMPLEX PO), Take  by mouth., Disp: , Rfl:     levoFLOXacin (Levaquin) 250 MG tablet, Take 1 tablet by mouth Daily., Disp: 10 tablet, Rfl: 0    lisinopril-hydrochlorothiazide (PRINZIDE,ZESTORETIC) 20-12.5 MG per tablet, Take 2 tablets by mouth once daily, Disp: 180 tablet, Rfl: 0    meloxicam (MOBIC) 15 MG tablet, Take 1 tablet by mouth once daily, Disp: 90 tablet, Rfl: 0    promethazine-dextromethorphan (PROMETHAZINE-DM) 6.25-15 MG/5ML syrup, Take 5 mL by mouth 4 (Four) Times a Day As Needed for Cough., Disp: 180 mL, Rfl: 0    rosuvastatin (CRESTOR) 10 MG tablet, Take 1 tablet by mouth Daily., Disp: 90 tablet, Rfl: 1    Allergies:   Allergies   Allergen Reactions    Amoxicillin Anaphylaxis    Cephalexin Anaphylaxis    Penicillins Anaphylaxis       Health Maintenance   Topic Date Due    COLORECTAL CANCER SCREENING  Never done    ZOSTER VACCINE (1 of 2) Never done    COVID-19 Vaccine (2023- season) 2023    INFLUENZA VACCINE  2024  "   LUNG CANCER SCREENING  02/23/2025    LIPID PANEL  08/06/2025    ANNUAL PHYSICAL  08/13/2025    BMI FOLLOWUP  08/13/2025    TDAP/TD VACCINES (2 - Td or Tdap) 06/07/2033    HEPATITIS C SCREENING  Completed    Pneumococcal Vaccine 0-64  Completed        PHQ-2 Total Score: 0        Intimate partner violence: (Screen on initial visit, older adult with injury or evidence of neglect):  Violence can be a problem in many people's lives, so I now ask every patient about trauma or abuse they may have experienced in a relationship.  Stress/Safety - Do you feel safe in your relationship?  Afraid/Abused - Have you ever been in a relationship where you were threatened, hurt, or afraid?  Friend/Family - Are your friends aware you have been hurt?  Emergency Plan - Do you have a safe place to go and the resources you need in an emergency?    Osteoporosis:   Men: history of low trauma fracture, androgen deprivation therapy for prostate cancer, hypogonadism, primary hyperparathyroidism, intestinal disorders.       Physical Exam:  Vital Signs:   Vitals:    08/13/24 0752   BP: 138/84   BP Location: Left arm   Patient Position: Sitting   Cuff Size: Adult   Pulse: 84   Resp: 16   SpO2: 98%   Weight: 88.4 kg (194 lb 12.8 oz)   Height: 177.8 cm (70\")     Body mass index is 27.95 kg/m².     Physical Exam  Vitals and nursing note reviewed.   Constitutional:       Appearance: Normal appearance. He is normal weight.   HENT:      Head: Normocephalic and atraumatic.      Right Ear: Tympanic membrane, ear canal and external ear normal.      Left Ear: Tympanic membrane, ear canal and external ear normal.      Nose: Nose normal.      Mouth/Throat:      Mouth: Mucous membranes are moist.      Pharynx: Oropharynx is clear.   Eyes:      Extraocular Movements: Extraocular movements intact.      Conjunctiva/sclera: Conjunctivae normal.      Pupils: Pupils are equal, round, and reactive to light.   Cardiovascular:      Rate and Rhythm: Normal rate and " regular rhythm.      Pulses: Normal pulses.      Heart sounds: Normal heart sounds.   Pulmonary:      Effort: Pulmonary effort is normal.      Breath sounds: Normal breath sounds.   Abdominal:      General: Abdomen is flat. Bowel sounds are normal.      Palpations: Abdomen is soft.   Musculoskeletal:         General: Normal range of motion.      Cervical back: Normal range of motion and neck supple.   Feet:      Comments:      Skin:     General: Skin is warm and dry.   Neurological:      General: No focal deficit present.      Mental Status: He is alert and oriented to person, place, and time.   Psychiatric:         Mood and Affect: Mood normal.         Behavior: Behavior normal.         Thought Content: Thought content normal.         Judgment: Judgment normal.           ECG 12 Lead    Date/Time: 8/13/2024 8:12 AM  Performed by: Case Borden MD    Authorized by: Case Borden MD  Comparison: not compared with previous ECG   Rhythm: sinus rhythm  Rate: normal  ST Segments: ST segments normal  T Waves: T waves normal  QRS axis: normal    Clinical impression: normal ECG  Comments: NSR            Assessment/Plan:   Diagnoses and all orders for this visit:    1. Physical exam (Primary)  Assessment & Plan:  We are to set patient up for colonoscopy.  We discussed health maintenance.  We discussed diet and exercise.  We also discussed smoking cessation.      2. Primary hypertension  Assessment & Plan:  Discussed with patient to monitor their blood pressure and if systolic blood pressure goes above 140 or diastolic is above 90 to return to clinic.  Take medicines as directed, call for any problems, patient not having or any worrisome symptoms.          3. Hyperlipidemia, mixed  Assessment & Plan:  HDL 40.  .  Recheck in 6 months.      4. Hereditary hemochromatosis  Assessment & Plan:  We are going to send patient to Dr. Diop.  He also has a recent macrocytosis which is somewhat concerning.  He has not  drank alcohol recently.      5. Hyperglycemia  Assessment & Plan:  A1c is 5.8.  Recheck in 6 months.      6. Vitamin D deficiency  Assessment & Plan:  Vitamin D is 27.0.  We will continue replacement and recheck in 6 months.      7. Adjustment disorder, unspecified type  Assessment & Plan:  This is stable.      8. Macrocytosis  Assessment & Plan:  MCV is 109.  Vitamin B12 is good.    Orders:  -     Ambulatory Referral to Hematology / Oncology  -     Ferritin; Future  -     Iron Profile; Future  -     Folate; Future  -     Ferritin  -     Iron Profile  -     Folate    9. Personal history of tobacco use, presenting hazards to health  Assessment & Plan:  Low-dose CT in March was good.      10. Screening for malignant neoplasm of colon  Assessment & Plan:  We are to set patient up for colonoscopy.  We discussed health maintenance.  We discussed diet and exercise.  We also discussed smoking cessation.    Orders:  -     Ambulatory Referral For Screening Colonoscopy    11. Shortness of breath  Assessment & Plan:  Patient has been having shortness of breath with exertion.  Does not have any chest pain.  EKG is okay.  I am going to get a stress test.    Orders:  -     Treadmill Stress Test; Future    12. Hemochromatosis, unspecified hemochromatosis type  Assessment & Plan:  We are going to send patient to Dr. Diop.  He also has a recent macrocytosis which is somewhat concerning.  He has not drank alcohol recently.    Orders:  -     Ambulatory Referral to Hematology / Oncology  -     Ferritin; Future  -     Iron Profile; Future  -     Folate; Future  -     Ferritin  -     Iron Profile  -     Folate    Other orders  -     ECG 12 Lead               Follow Up:   Return in about 3 months (around 11/13/2024) for Annual physical, Bloodwork 1 week prior to next appointment.    Healthcare Maintenance:   Counseling provided on HM and immunizations.   Maynor Villagomez voices understanding and acceptance of this advice and will call back  with any further questions or concerns. AVS with preventive healthcare tips printed for patient.     Case Borden MD  [facility] [place]

## 2024-08-13 NOTE — ASSESSMENT & PLAN NOTE
We are to set patient up for colonoscopy.  We discussed health maintenance.  We discussed diet and exercise.  We also discussed smoking cessation.

## 2024-08-13 NOTE — ASSESSMENT & PLAN NOTE
We are going to send patient to Dr. Diop.  He also has a recent macrocytosis which is somewhat concerning.  He has not drank alcohol recently.

## 2024-08-14 LAB
FERRITIN SERPL-MCNC: 538 NG/ML (ref 30–400)
FOLATE SERPL-MCNC: >20 NG/ML
IRON SATN MFR SERPL: 92 % (ref 15–55)
IRON SERPL-MCNC: 224 UG/DL (ref 38–169)
TIBC SERPL-MCNC: 243 UG/DL (ref 250–450)
UIBC SERPL-MCNC: 19 UG/DL (ref 111–343)

## 2024-09-03 ENCOUNTER — CONSULT (OUTPATIENT)
Dept: ONCOLOGY | Facility: CLINIC | Age: 64
End: 2024-09-03
Payer: COMMERCIAL

## 2024-09-03 VITALS
RESPIRATION RATE: 18 BRPM | DIASTOLIC BLOOD PRESSURE: 67 MMHG | SYSTOLIC BLOOD PRESSURE: 124 MMHG | OXYGEN SATURATION: 97 % | TEMPERATURE: 97.5 F | HEIGHT: 70 IN | WEIGHT: 197 LBS | BODY MASS INDEX: 28.2 KG/M2 | HEART RATE: 70 BPM

## 2024-09-03 DIAGNOSIS — E83.110 HEREDITARY HEMOCHROMATOSIS: Primary | ICD-10-CM

## 2024-09-03 DIAGNOSIS — D75.89 MACROCYTOSIS: ICD-10-CM

## 2024-09-03 PROCEDURE — 99204 OFFICE O/P NEW MOD 45 MIN: CPT | Performed by: INTERNAL MEDICINE

## 2024-09-03 NOTE — PROGRESS NOTES
CHIEF COMPLAINT: No specific complaints    REASON FOR REFERRAL: Macrocytosis and hemochromatosis      RECORDS OBTAINED  Records of the patients history including those obtained from primary care were reviewed and summarized in detail.    Oncology/Hematology History Overview Note   1.  Macrocytosis with normal B12 and folate and liver enzymes  2.  Hemochromatosis  3.  Hypertension  4.  Hyperlipidemia  5.  Left adrenal adenoma  6.  History of alcohol abuse  7.  History of tobacco abuse  8.  Trigeminal neuralgia    -2/23/2024 CT low-dose chest lung RADS 1.  Left adrenal adenoma 4.1 cm  -8/13/2024 iron elevated to 24 saturation elevated 92% total iron binding capacity decreased to 43.  Ferritin 538.  Hemoglobin 15.5     -9/3/2024 [facility] hematology consult: Patient very likely has hemochromatosis with this degree of iron saturation.  He had been seen by Dr. 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  He even states that she did a liver biopsy.  Again, I do not have those results.  Nonetheless his laboratories just on the basis of the ferritin and the iron saturation are convincing enough to get him back onto phlebotomies which we will try to do through the [facility] blood center starting with weekly phlebotomies and then go to monthly phlebotomies and we will aim for ferritin less than 100 holding phlebotomies for hemoglobin less than 11.  As for the macrocytosis, he may have some liver injury from the hemochromatosis and I will check hepatic ultrasound will check his GGT and pro time.  Liver enzymes normal thus far on CMP.  Will check megaloblastic panel.     Hemochromatosis   4/22/2022 Initial Diagnosis    Hemochromatosis         HISTORY OF PRESENT ILLNESS:  The patient is a 63 y.o.  male, referred for macrocytosis and hemochromatosis with alcohol and tobacco  use    REVIEW OF SYSTEMS:  No specific somatic complaints.    Past Medical History:   Diagnosis Date    Arthritis     Bilateral carpal tunnel syndrome 05/03/2016    Depression     Generalized erosion of teeth     Hemochromatosis 04/22/2022    History of alcohol abuse     History of carpal tunnel syndrome     History of hepatitis     History of nicotine dependence     History of trigeminal neuralgia     Primary hypertension 04/26/2007    Right trigeminal neuralgia 05/03/2016    Spinal stenosis in cervical region 05/03/2016    Tobacco use 10/02/2019     Past Surgical History:   Procedure Laterality Date    ARM WOUND REPAIR / CLOSURE Right     MOUTH SURGERY      Tooth Extraction       Current Outpatient Medications on File Prior to Visit   Medication Sig Dispense Refill    albuterol sulfate  (90 Base) MCG/ACT inhaler Inhale 2 puffs Every 4 (Four) Hours As Needed for Wheezing. 18 g 5    amLODIPine (NORVASC) 5 MG tablet Take 1 tablet by mouth Daily. 90 tablet 0    B Complex Vitamins (VITAMIN B COMPLEX PO) Take  by mouth.      levoFLOXacin (Levaquin) 250 MG tablet Take 1 tablet by mouth Daily. 10 tablet 0    lisinopril-hydrochlorothiazide (PRINZIDE,ZESTORETIC) 20-12.5 MG per tablet Take 2 tablets by mouth Daily. 180 tablet 0    meloxicam (MOBIC) 15 MG tablet Take 1 tablet by mouth once daily 90 tablet 0    promethazine-dextromethorphan (PROMETHAZINE-DM) 6.25-15 MG/5ML syrup Take 5 mL by mouth 4 (Four) Times a Day As Needed for Cough. 180 mL 0    rosuvastatin (CRESTOR) 10 MG tablet Take 1 tablet by mouth Daily. 90 tablet 1     No current facility-administered medications on file prior to visit.       Allergies   Allergen Reactions    Amoxicillin Anaphylaxis    Cephalexin Anaphylaxis    Penicillins Anaphylaxis       Social History     Socioeconomic History    Marital status:    Tobacco Use    Smoking status: Every Day     Current packs/day: 1.00     Average packs/day: 1 pack/day for 48.7 years (48.7 ttl  "pk-yrs)     Types: Cigarettes     Start date: 1976    Smokeless tobacco: Never   Vaping Use    Vaping status: Never Used   Substance and Sexual Activity    Alcohol use: No    Drug use: No    Sexual activity: Yes       Family History   Problem Relation Age of Onset    Diabetes type II Mother     Heart disease Father        PHYSICAL EXAM:  No plethora.  No jaundice icterus or pallor or respiratory distress.    /67   Pulse 70   Temp 97.5 °F (36.4 °C)   Resp 18   Ht 177.8 cm (70\")   Wt 89.4 kg (197 lb)   SpO2 97%   BMI 28.27 kg/m²     ECOG score: 0           ECOG: (0) Fully Active - Able to Carry On All Pre-disease Performance Without Restriction    Lab Results   Component Value Date    HGB 15.5 08/06/2024    HCT 47.9 08/06/2024     (H) 08/06/2024     08/06/2024    WBC 10.0 08/06/2024    NEUTROABS 5.4 08/06/2024    LYMPHSABS 3.0 08/06/2024    MONOSABS 1.3 (H) 08/06/2024    EOSABS 0.3 08/06/2024    BASOSABS 0.1 08/06/2024     Lab Results   Component Value Date    GLUCOSE 100 (H) 08/06/2024    BUN 26 08/06/2024    CREATININE 0.87 08/06/2024     08/06/2024    K 4.5 08/06/2024     08/06/2024    CO2 22 08/06/2024    CALCIUM 9.7 08/06/2024    PROTEINTOT 6.8 03/15/2016    ALBUMIN 4.2 08/06/2024    BILITOT 0.2 08/06/2024    ALKPHOS 74 08/06/2024    AST 18 08/06/2024    ALT 22 08/06/2024         Assessment & Plan     1.  Macrocytosis with normal B12 and folate and liver enzymes  2.  Hemochromatosis  3.  Hypertension  4.  Hyperlipidemia  5.  Left adrenal adenoma  6.  History of alcohol abuse  7.  History of tobacco abuse  8.  Trigeminal neuralgia    -2/23/2024 CT low-dose chest lung RADS 1.  Left adrenal adenoma 4.1 cm  -8/13/2024 iron elevated to 24 saturation elevated 92% total iron binding capacity decreased to 43.  Ferritin 538.  Hemoglobin 15.5     -9/3/2024 [facility] hematology consult: Patient very likely has hemochromatosis with this degree of iron saturation.  He had been seen " by Dr. 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  He even states that she did a liver biopsy.  Again, I do not have those results.  Nonetheless his laboratories just on the basis of the ferritin and the iron saturation are convincing enough to get him back onto phlebotomies which we will try to do through the [facility] blood [place] starting with weekly phlebotomies and then go to monthly phlebotomies and we will aim for ferritin less than 100 holding phlebotomies for hemoglobin less than 11.  As for the macrocytosis, he may have some liver injury from the hemochromatosis and I will check hepatic ultrasound will check his GGT and pro time.  Liver enzymes normal thus far on CMP.  Will check megaloblastic panel.    Total time of care today inclusive of time spent today prior to his arrival reviewing and cataloging past data as outlined above and during visit interviewing as to signs or symptoms causes and consequences of iron overload and macrocytosis and after visit instituting this plan took 46 minutes patient care time throughout the day today.    Jae Diop MD    9/3/2024

## 2024-09-03 NOTE — LETTER
September 3, 2024       No Recipients    Patient: Maynor Villagomez   YOB: 1960   Date of Visit: 9/3/2024     Dear Case Borden MD:       Thank you for referring Maynor Villagomez to me for evaluation. Below are the relevant portions of my assessment and plan of care.    If you have questions, please do not hesitate to call me. I look forward to following Maynor along with you.         Sincerely,        Jae Diop MD        CC:   No Recipients    Jae Diop MD  09/03/24 0751  Sign when Signing Visit  CHIEF COMPLAINT: No specific complaints    REASON FOR REFERRAL: Macrocytosis and hemochromatosis      RECORDS OBTAINED  Records of the patients history including those obtained from primary care were reviewed and summarized in detail.    Oncology/Hematology History Overview Note   1.  Macrocytosis with normal B12 and folate and liver enzymes  2.  Hemochromatosis  3.  Hypertension  4.  Hyperlipidemia  5.  Left adrenal adenoma  6.  History of alcohol abuse  7.  History of tobacco abuse  8.  Trigeminal neuralgia    -2/23/2024 CT low-dose chest lung RADS 1.  Left adrenal adenoma 4.1 cm  -8/13/2024 iron elevated to 24 saturation elevated 92% total iron binding capacity decreased to 43.  Ferritin 538.  Hemoglobin 15.5     -9/3/2024 [facility] hematology consult: Patient very likely has hemochromatosis with this degree of iron saturation.  He had been seen by Dr. 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  He even states that she did a liver biopsy.  Again, I do not have those results.  Nonetheless his laboratories just on the basis of the ferritin and the iron saturation are convincing enough to get him back onto phlebotomies which we will try to do through the [facility] blood center starting with weekly phlebotomies and then go to monthly phlebotomies  and we will aim for ferritin less than 100 holding phlebotomies for hemoglobin less than 11.  As for the macrocytosis, he may have some liver injury from the hemochromatosis and I will check hepatic ultrasound will check his GGT and pro time.  Liver enzymes normal thus far on CMP.  Will check megaloblastic panel.     Hemochromatosis   4/22/2022 Initial Diagnosis    Hemochromatosis         HISTORY OF PRESENT ILLNESS:  The patient is a 63 y.o.  male, referred for macrocytosis and hemochromatosis with alcohol and tobacco use    REVIEW OF SYSTEMS:  No specific somatic complaints.    Past Medical History:   Diagnosis Date   • Arthritis    • Bilateral carpal tunnel syndrome 05/03/2016   • Depression    • Generalized erosion of teeth    • Hemochromatosis 04/22/2022   • History of alcohol abuse    • History of carpal tunnel syndrome    • History of hepatitis    • History of nicotine dependence    • History of trigeminal neuralgia    • Primary hypertension 04/26/2007   • Right trigeminal neuralgia 05/03/2016   • Spinal stenosis in cervical region 05/03/2016   • Tobacco use 10/02/2019     Past Surgical History:   Procedure Laterality Date   • ARM WOUND REPAIR / CLOSURE Right    • MOUTH SURGERY      Tooth Extraction       Current Outpatient Medications on File Prior to Visit   Medication Sig Dispense Refill   • albuterol sulfate  (90 Base) MCG/ACT inhaler Inhale 2 puffs Every 4 (Four) Hours As Needed for Wheezing. 18 g 5   • amLODIPine (NORVASC) 5 MG tablet Take 1 tablet by mouth Daily. 90 tablet 0   • B Complex Vitamins (VITAMIN B COMPLEX PO) Take  by mouth.     • levoFLOXacin (Levaquin) 250 MG tablet Take 1 tablet by mouth Daily. 10 tablet 0   • lisinopril-hydrochlorothiazide (PRINZIDE,ZESTORETIC) 20-12.5 MG per tablet Take 2 tablets by mouth Daily. 180 tablet 0   • meloxicam (MOBIC) 15 MG tablet Take 1 tablet by mouth once daily 90 tablet 0   • promethazine-dextromethorphan (PROMETHAZINE-DM) 6.25-15 MG/5ML syrup Take  "5 mL by mouth 4 (Four) Times a Day As Needed for Cough. 180 mL 0   • rosuvastatin (CRESTOR) 10 MG tablet Take 1 tablet by mouth Daily. 90 tablet 1     No current facility-administered medications on file prior to visit.       Allergies   Allergen Reactions   • Amoxicillin Anaphylaxis   • Cephalexin Anaphylaxis   • Penicillins Anaphylaxis       Social History     Socioeconomic History   • Marital status:    Tobacco Use   • Smoking status: Every Day     Current packs/day: 1.00     Average packs/day: 1 pack/day for 48.7 years (48.7 ttl pk-yrs)     Types: Cigarettes     Start date: 1976   • Smokeless tobacco: Never   Vaping Use   • Vaping status: Never Used   Substance and Sexual Activity   • Alcohol use: No   • Drug use: No   • Sexual activity: Yes       Family History   Problem Relation Age of Onset   • Diabetes type II Mother    • Heart disease Father        PHYSICAL EXAM:  No plethora.  No jaundice icterus or pallor or respiratory distress.    /67   Pulse 70   Temp 97.5 °F (36.4 °C)   Resp 18   Ht 177.8 cm (70\")   Wt 89.4 kg (197 lb)   SpO2 97%   BMI 28.27 kg/m²     ECOG score: 0           ECOG: (0) Fully Active - Able to Carry On All Pre-disease Performance Without Restriction    Lab Results   Component Value Date    HGB 15.5 08/06/2024    HCT 47.9 08/06/2024     (H) 08/06/2024     08/06/2024    WBC 10.0 08/06/2024    NEUTROABS 5.4 08/06/2024    LYMPHSABS 3.0 08/06/2024    MONOSABS 1.3 (H) 08/06/2024    EOSABS 0.3 08/06/2024    BASOSABS 0.1 08/06/2024     Lab Results   Component Value Date    GLUCOSE 100 (H) 08/06/2024    BUN 26 08/06/2024    CREATININE 0.87 08/06/2024     08/06/2024    K 4.5 08/06/2024     08/06/2024    CO2 22 08/06/2024    CALCIUM 9.7 08/06/2024    PROTEINTOT 6.8 03/15/2016    ALBUMIN 4.2 08/06/2024    BILITOT 0.2 08/06/2024    ALKPHOS 74 08/06/2024    AST 18 08/06/2024    ALT 22 08/06/2024         Assessment & Plan    1.  Macrocytosis with normal B12 " and folate and liver enzymes  2.  Hemochromatosis  3.  Hypertension  4.  Hyperlipidemia  5.  Left adrenal adenoma  6.  History of alcohol abuse  7.  History of tobacco abuse  8.  Trigeminal neuralgia    -2/23/2024 CT low-dose chest lung RADS 1.  Left adrenal adenoma 4.1 cm  -8/13/2024 iron elevated to 24 saturation elevated 92% total iron binding capacity decreased to 43.  Ferritin 538.  Hemoglobin 15.5     -9/3/2024 [facility] hematology consult: Patient very likely has hemochromatosis with this degree of iron saturation.  He had been seen by Dr. 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  He even states that she did a liver biopsy.  Again, I do not have those results.  Nonetheless his laboratories just on the basis of the ferritin and the iron saturation are convincing enough to get him back onto phlebotomies which we will try to do through the [facility] blood [place] starting with weekly phlebotomies and then go to monthly phlebotomies and we will aim for ferritin less than 100 holding phlebotomies for hemoglobin less than 11.  As for the macrocytosis, he may have some liver injury from the hemochromatosis and I will check hepatic ultrasound will check his GGT and pro time.  Liver enzymes normal thus far on CMP.  Will check megaloblastic panel.    Total time of care today inclusive of time spent today prior to his arrival reviewing and cataloging past data as outlined above and during visit interviewing as to signs or symptoms causes and consequences of iron overload and macrocytosis and after visit instituting this plan took 46 minutes patient care time throughout the day today.    Jae Diop MD    9/3/2024

## 2024-09-09 ENCOUNTER — HOSPITAL ENCOUNTER (OUTPATIENT)
Dept: ULTRASOUND IMAGING | Facility: HOSPITAL | Age: 64
Discharge: HOME | End: 2024-09-09
Admitting: INTERNAL MEDICINE
Payer: COMMERCIAL

## 2024-09-09 DIAGNOSIS — E83.110 HEREDITARY HEMOCHROMATOSIS: ICD-10-CM

## 2024-09-09 DIAGNOSIS — D75.89 MACROCYTOSIS: ICD-10-CM

## 2024-09-09 PROCEDURE — 76705 ECHO EXAM OF ABDOMEN: CPT

## 2024-09-13 LAB
BILIRUB DIRECT SERPL-MCNC: 0.12 MG/DL (ref 0–0.4)
BILIRUB INDIRECT SERPL-MCNC: 0.18 MG/DL (ref 0.1–0.8)
BILIRUB SERPL-MCNC: 0.3 MG/DL (ref 0–1.2)
DAT POLY-SP REAG RBC QL: NEGATIVE
EPO SERPL-ACNC: 14.6 MIU/ML (ref 2.6–18.5)
FERRITIN SERPL-MCNC: 481 NG/ML (ref 30–400)
FOLATE SERPL-MCNC: >20 NG/ML
GGT SERPL-CCNC: 24 IU/L (ref 0–65)
HAPTOGLOB SERPL-MCNC: 196 MG/DL (ref 32–363)
HCYS SERPL-SCNC: 9.5 UMOL/L (ref 0–17.2)
HFE GENE MUT ANL BLD/T: NORMAL
IMP & REVIEW OF LAB RESULTS: NORMAL
INR PPP: 1 (ref 0.9–1.2)
IRON SERPL-MCNC: 136 UG/DL (ref 38–169)
METHYLMALONATE SERPL-SCNC: 204 NMOL/L (ref 0–378)
PROTHROMBIN TIME: 10.8 SEC (ref 9.1–12)
RETICS/RBC NFR AUTO: 2.4 % (ref 0.6–2.6)
VIT B12 SERPL-MCNC: 612 PG/ML (ref 232–1245)

## 2024-10-01 ENCOUNTER — OFFICE VISIT (OUTPATIENT)
Dept: ONCOLOGY | Facility: CLINIC | Age: 64
End: 2024-10-01
Payer: COMMERCIAL

## 2024-10-01 VITALS
WEIGHT: 198 LBS | RESPIRATION RATE: 18 BRPM | OXYGEN SATURATION: 97 % | DIASTOLIC BLOOD PRESSURE: 70 MMHG | SYSTOLIC BLOOD PRESSURE: 102 MMHG | TEMPERATURE: 97.8 F | HEIGHT: 70 IN | HEART RATE: 73 BPM | BODY MASS INDEX: 28.35 KG/M2

## 2024-10-01 DIAGNOSIS — E83.119 HEMOCHROMATOSIS, UNSPECIFIED HEMOCHROMATOSIS TYPE: Primary | ICD-10-CM

## 2024-10-01 PROCEDURE — 99214 OFFICE O/P EST MOD 30 MIN: CPT | Performed by: INTERNAL MEDICINE

## 2024-10-01 NOTE — PROGRESS NOTES
CHIEF COMPLAINT: No new somatic complaints    Problem List:  Oncology/Hematology History Overview Note   1.  Macrocytosis with normal B12 and folate and liver enzymes  2.  Hemochromatosis  3.  Hypertension  4.  Hyperlipidemia  5.  Left adrenal adenoma  6.  History of alcohol abuse  7.  History of tobacco abuse  8.  Trigeminal neuralgia    -2/23/2024 CT low-dose chest lung RADS 1.  Left adrenal adenoma 4.1 cm  -8/13/2024 iron elevated to 24 saturation elevated 92% total iron binding capacity decreased to 43.  Ferritin 538.  Hemoglobin 15.5     -9/3/2024 [facility] consult: Patient very likely has hemochromatosis with this degree of iron saturation.  He had been seen by Dr. Castillo and had apparently blood testing that proved that this was inherited I assume this was an HFE gene mutation but I do not have those results and her office is permanently closed.  He even states that she did a liver biopsy.  Again, I do not have those results.  Nonetheless his laboratories just on the basis of the ferritin and the iron saturation are convincing enough to get him back onto phlebotomies which we will try to do through the [facility] blood center starting with weekly phlebotomies and then go to monthly phlebotomies and we will aim for ferritin less than 100 holding phlebotomies for hemoglobin less than 11.  As for the macrocytosis, he may have some liver injury from the hemochromatosis and I will check hepatic ultrasound will check his GGT and pro time.  Liver enzymes normal thus far on CMP.  Will check megaloblastic panel.    -9/3/2024 homozygous C282Y hemochromatosis gene mutation  Erythropoietin level 14.6 normal.  GGT normal at 24.  INR 1.0  Megaloblastic panel normal: B12 normal 612.  Methylmalonic acid normal 204.  Homocystine normal 9.5.   folic acid greater than 20.  Hemolytic panel normal: ReticulocyteThe site count 2.4% normal.  Haptoglobin 196 normal.  VIGNESH negative.  Total direct and indirect  "bilirubin all normal.    -9/9/2024 ultrasound liver shows hepatic steatosis without biliary dilation or suspicious focal hepatic lesion.  15 mm right kidney simple cyst.       Hemochromatosis   4/22/2022 Initial Diagnosis    Hemochromatosis         HISTORY OF PRESENT ILLNESS:  The patient is a 63 y.o. male, here for follow up on management of hemochromatosis    Past Medical History:   Diagnosis Date    Arthritis     Bilateral carpal tunnel syndrome 05/03/2016    Depression     Generalized erosion of teeth     Hemochromatosis 04/22/2022    History of alcohol abuse     History of carpal tunnel syndrome     History of hepatitis     History of nicotine dependence     History of trigeminal neuralgia     Primary hypertension 04/26/2007    Right trigeminal neuralgia 05/03/2016    Spinal stenosis in cervical region 05/03/2016    Tobacco use 10/02/2019     Past Surgical History:   Procedure Laterality Date    ARM WOUND REPAIR / CLOSURE Right     MOUTH SURGERY      Tooth Extraction       Allergies   Allergen Reactions    Amoxicillin Anaphylaxis    Cephalexin Anaphylaxis    Penicillins Anaphylaxis       Family History and Social History reviewed and changed as necessary    REVIEW OF SYSTEM:   No new somatic complaints    PHYSICAL EXAM:  No jaundice icterus or pallor.  No respiratory distress.    Vitals:    10/01/24 0858   BP: 102/70   Pulse: 73   Resp: 18   Temp: 97.8 °F (36.6 °C)   SpO2: 97%   Weight: 89.8 kg (198 lb)   Height: 177.8 cm (70\")     Vitals:    10/01/24 0858   PainSc:   4   PainLoc: Generalized  Comment: joints and shoulders          ECOG score: 0           Vitals reviewed.        Lab Results   Component Value Date    HGB 15.5 08/06/2024    HCT 47.9 08/06/2024     (H) 08/06/2024     08/06/2024    WBC 10.0 08/06/2024    NEUTROABS 5.4 08/06/2024    LYMPHSABS 3.0 08/06/2024    MONOSABS 1.3 (H) 08/06/2024    EOSABS 0.3 08/06/2024    BASOSABS 0.1 08/06/2024       Lab Results   Component Value Date    " GLUCOSE 100 (H) 08/06/2024    BUN 26 08/06/2024    CREATININE 0.87 08/06/2024     08/06/2024    K 4.5 08/06/2024     08/06/2024    CO2 22 08/06/2024    CALCIUM 9.7 08/06/2024    PROTEINTOT 6.8 03/15/2016    ALBUMIN 4.2 08/06/2024    BILITOT 0.3 09/03/2024    ALKPHOS 74 08/06/2024    AST 18 08/06/2024    ALT 22 08/06/2024             ASSESSMENT & PLAN:  1.  Macrocytosis with normal B12 and folate and liver enzymes  2.  Hemochromatosis  3.  Hypertension  4.  Hyperlipidemia  5.  Left adrenal adenoma  6.  History of alcohol abuse  7.  History of tobacco abuse  8.  Trigeminal neuralgia    -2/23/2024 CT low-dose chest lung RADS 1.  Left adrenal adenoma 4.1 cm  -8/13/2024 iron elevated to 24 saturation elevated 92% total iron binding capacity decreased to 43.  Ferritin 538.  Hemoglobin 15.5     -9/3/2024 [facility] consult: Patient very likely has hemochromatosis with this degree of iron saturation.  He had been seen by Dr. Castillo and had apparently blood testing that proved that this was inherited I assume this was an HFE gene mutation but I do not have those results and her office is permanently closed.  He even states that she did a liver biopsy.  Again, I do not have those results.  Nonetheless his laboratories just on the basis of the ferritin and the iron saturation are convincing enough to get him back onto phlebotomies which we will try to do through the [facility] blood center starting with weekly phlebotomies and then go to monthly phlebotomies and we will aim for ferritin less than 100 holding phlebotomies for hemoglobin less than 11.  As for the macrocytosis, he may have some liver injury from the hemochromatosis and I will check hepatic ultrasound will check his GGT and pro time.  Liver enzymes normal thus far on CMP.  Will check megaloblastic panel.    -9/3/2024 homozygous C282Y hemochromatosis gene mutation  Erythropoietin level 14.6 normal.  GGT normal at 24.  INR  1.0  Megaloblastic panel normal: B12 normal 612.  Methylmalonic acid normal 204.  Homocystine normal 9.5.   folic acid greater than 20.  Hemolytic panel normal: ReticulocyteThe site count 2.4% normal.  Haptoglobin 196 normal.  VIGNESH negative.  Total direct and indirect bilirubin all normal.    -9/9/2024 ultrasound liver shows hepatic steatosis without biliary dilation or suspicious focal hepatic lesion.  15 mm right kidney simple cyst.    -10/1/2024 [facility] follow-up: Clearly has hemochromatosis.  No patent liver disease enzymatically or per coagulation studies.  Has fatty liver as well.  No clear-cut explanation for the macrocytosis but he is not anemic and I will not push him further down that road towards bone marrow at this junction.  He has had 3 out of his 4 weekly phlebotomies and then we will go to monthly and I will see him back in 3 months with iron indices and CBC and CMP prior to return.  He is doing this at the [facility] blood [place] holding for hemoglobin less than 11.  Our goal is to get ferritin below 100 without causing anemia.    Total time of care today inclusive of time spent today prior to patient's arrival reviewing past records and interval data since last I saw him and during visit translating that information to the patient putting forth a plan as outlined in after visit instituting this plan took 40 minutes patient care time throughout the day today.  Jae Diop MD    10/01/2024

## 2024-10-01 NOTE — LETTER
October 1, 2024       No Recipients    Patient: Maynor Villagomez   YOB: 1960   Date of Visit: 10/1/2024     Dear Case Borden MD:       Thank you for referring Maynor Villagomez to me for evaluation. Below are the relevant portions of my assessment and plan of care.    If you have questions, please do not hesitate to call me. I look forward to following Maynor along with you.         Sincerely,        Jae Diop MD        CC:   No Recipients    Jae Diop MD  10/01/24 1002  Sign when Signing Visit  CHIEF COMPLAINT: No new somatic complaints    Problem List:  Oncology/Hematology History Overview Note   1.  Macrocytosis with normal B12 and folate and liver enzymes  2.  Hemochromatosis  3.  Hypertension  4.  Hyperlipidemia  5.  Left adrenal adenoma  6.  History of alcohol abuse  7.  History of tobacco abuse  8.  Trigeminal neuralgia    -2/23/2024 CT low-dose chest lung RADS 1.  Left adrenal adenoma 4.1 cm  -8/13/2024 iron elevated to 24 saturation elevated 92% total iron binding capacity decreased to 43.  Ferritin 538.  Hemoglobin 15.5     -9/3/2024 [facility] hematology consult: Patient very likely has hemochromatosis with this degree of iron saturation.  He had been seen by Dr. Castillo and had apparently blood testing that proved that this was inherited I assume this was an HFE gene mutation but I do not have those results and her office is permanently closed.  He even states that she did a liver biopsy.  Again, I do not have those results.  Nonetheless his laboratories just on the basis of the ferritin and the iron saturation are convincing enough to get him back onto phlebotomies which we will try to do through the [facility] blood center starting with weekly phlebotomies and then go to monthly phlebotomies and we will aim for ferritin less than 100 holding phlebotomies for hemoglobin less than 11.  As for the macrocytosis, he may have some liver injury from the hemochromatosis  and I will check hepatic ultrasound will check his GGT and pro time.  Liver enzymes normal thus far on CMP.  Will check megaloblastic panel.    -9/3/2024 homozygous C282Y hemochromatosis gene mutation  Erythropoietin level 14.6 normal.  GGT normal at 24.  INR 1.0  Megaloblastic panel normal: B12 normal 612.  Methylmalonic acid normal 204.  Homocystine normal 9.5.   folic acid greater than 20.  Hemolytic panel normal: ReticulocyteThe site count 2.4% normal.  Haptoglobin 196 normal.  VIGNESH negative.  Total direct and indirect bilirubin all normal.    -9/9/2024 ultrasound liver shows hepatic steatosis without biliary dilation or suspicious focal hepatic lesion.  15 mm right kidney simple cyst.       Hemochromatosis   4/22/2022 Initial Diagnosis    Hemochromatosis         HISTORY OF PRESENT ILLNESS:  The patient is a 63 y.o. male, here for follow up on management of hemochromatosis    Past Medical History:   Diagnosis Date   • Arthritis    • Bilateral carpal tunnel syndrome 05/03/2016   • Depression    • Generalized erosion of teeth    • Hemochromatosis 04/22/2022   • History of alcohol abuse    • History of carpal tunnel syndrome    • History of hepatitis    • History of nicotine dependence    • History of trigeminal neuralgia    • Primary hypertension 04/26/2007   • Right trigeminal neuralgia 05/03/2016   • Spinal stenosis in cervical region 05/03/2016   • Tobacco use 10/02/2019     Past Surgical History:   Procedure Laterality Date   • ARM WOUND REPAIR / CLOSURE Right    • MOUTH SURGERY      Tooth Extraction       Allergies   Allergen Reactions   • Amoxicillin Anaphylaxis   • Cephalexin Anaphylaxis   • Penicillins Anaphylaxis       Family History and Social History reviewed and changed as necessary    REVIEW OF SYSTEM:   No new somatic complaints    PHYSICAL EXAM:  No jaundice icterus or pallor.  No respiratory distress.    Vitals:    10/01/24 0858   BP: 102/70   Pulse: 73   Resp: 18   Temp: 97.8 °F (36.6 °C)   SpO2:  "97%   Weight: 89.8 kg (198 lb)   Height: 177.8 cm (70\")     Vitals:    10/01/24 0858   PainSc:   4   PainLoc: Generalized  Comment: joints and shoulders          ECOG score: 0           Vitals reviewed.        Lab Results   Component Value Date    HGB 15.5 08/06/2024    HCT 47.9 08/06/2024     (H) 08/06/2024     08/06/2024    WBC 10.0 08/06/2024    NEUTROABS 5.4 08/06/2024    LYMPHSABS 3.0 08/06/2024    MONOSABS 1.3 (H) 08/06/2024    EOSABS 0.3 08/06/2024    BASOSABS 0.1 08/06/2024       Lab Results   Component Value Date    GLUCOSE 100 (H) 08/06/2024    BUN 26 08/06/2024    CREATININE 0.87 08/06/2024     08/06/2024    K 4.5 08/06/2024     08/06/2024    CO2 22 08/06/2024    CALCIUM 9.7 08/06/2024    PROTEINTOT 6.8 03/15/2016    ALBUMIN 4.2 08/06/2024    BILITOT 0.3 09/03/2024    ALKPHOS 74 08/06/2024    AST 18 08/06/2024    ALT 22 08/06/2024             ASSESSMENT & PLAN:  1.  Macrocytosis with normal B12 and folate and liver enzymes  2.  Hemochromatosis  3.  Hypertension  4.  Hyperlipidemia  5.  Left adrenal adenoma  6.  History of alcohol abuse  7.  History of tobacco abuse  8.  Trigeminal neuralgia    -2/23/2024 CT low-dose chest lung RADS 1.  Left adrenal adenoma 4.1 cm  -8/13/2024 iron elevated to 24 saturation elevated 92% total iron binding capacity decreased to 43.  Ferritin 538.  Hemoglobin 15.5     -9/3/2024 [facility] hematology consult: Patient very likely has hemochromatosis with this degree of iron saturation.  He had been seen by Dr. Castillo and had apparently blood testing that proved that this was inherited I assume this was an HFE gene mutation but I do not have those results and her office is permanently closed.  He even states that she did a liver biopsy.  Again, I do not have those results.  Nonetheless his laboratories just on the basis of the ferritin and the iron saturation are convincing enough to get him back onto phlebotomies which we will try to do through " the [facility] blood [place] starting with weekly phlebotomies and then go to monthly phlebotomies and we will aim for ferritin less than 100 holding phlebotomies for hemoglobin less than 11.  As for the macrocytosis, he may have some liver injury from the hemochromatosis and I will check hepatic ultrasound will check his GGT and pro time.  Liver enzymes normal thus far on CMP.  Will check megaloblastic panel.    -9/3/2024 homozygous C282Y hemochromatosis gene mutation  Erythropoietin level 14.6 normal.  GGT normal at 24.  INR 1.0  Megaloblastic panel normal: B12 normal 612.  Methylmalonic acid normal 204.  Homocystine normal 9.5.   folic acid greater than 20.  Hemolytic panel normal: ReticulocyteThe site count 2.4% normal.  Haptoglobin 196 normal.  VIGNESH negative.  Total direct and indirect bilirubin all normal.    -9/9/2024 ultrasound liver shows hepatic steatosis without biliary dilation or suspicious focal hepatic lesion.  15 mm right kidney simple cyst.    -10/1/2024 [facility] follow-up: Clearly has hemochromatosis.  No patent liver disease enzymatically or per coagulation studies.  Has fatty liver as well.  No clear-cut explanation for the macrocytosis but he is not anemic and I will not push him further down that road towards bone marrow at this junction.  He has had 3 out of his 4 weekly phlebotomies and then we will go to monthly and I will see him back in 3 months with iron indices and CBC and CMP prior to return.  He is doing this at the [facility] holding for hemoglobin less than 11.  Our goal is to get ferritin below 100 without causing anemia.    Total time of care today inclusive of time spent today prior to patient's arrival reviewing past records and interval data since last I saw him and during visit translating that information to the patient putting forth a plan as outlined in after visit instituting this plan took 40 minutes patient care time throughout the day  today.  Jae Diop MD    10/01/2024

## 2024-11-06 DIAGNOSIS — M15.9 OSTEOARTHRITIS OF MULTIPLE JOINTS, UNSPECIFIED OSTEOARTHRITIS TYPE: ICD-10-CM

## 2024-11-06 RX ORDER — MELOXICAM 15 MG/1
15 TABLET ORAL DAILY
Qty: 90 TABLET | Refills: 0 | Status: SHIPPED | OUTPATIENT
Start: 2024-11-06

## 2025-01-23 ENCOUNTER — OFFICE VISIT (OUTPATIENT)
Dept: ONCOLOGY | Facility: CLINIC | Age: 65
End: 2025-01-23
Payer: COMMERCIAL

## 2025-01-23 VITALS
OXYGEN SATURATION: 96 % | WEIGHT: 200 LBS | HEART RATE: 78 BPM | BODY MASS INDEX: 28.63 KG/M2 | HEIGHT: 70 IN | DIASTOLIC BLOOD PRESSURE: 86 MMHG | SYSTOLIC BLOOD PRESSURE: 144 MMHG | TEMPERATURE: 98.2 F | RESPIRATION RATE: 18 BRPM

## 2025-01-23 DIAGNOSIS — E83.110 HEREDITARY HEMOCHROMATOSIS: Primary | ICD-10-CM

## 2025-01-23 PROCEDURE — 99213 OFFICE O/P EST LOW 20 MIN: CPT | Performed by: NURSE PRACTITIONER

## 2025-01-23 NOTE — PROGRESS NOTES
CHIEF COMPLAINT: Hemochromatosis    Problem List:  Oncology/Hematology History Overview Note   1.  Macrocytosis with normal B12 and folate and liver enzymes  2.  Hemochromatosis  3.  Hypertension  4.  Hyperlipidemia  5.  Left adrenal adenoma  6.  History of alcohol abuse  7.  History of tobacco abuse  8.  Trigeminal neuralgia    -2/23/2024 CT low-dose chest lung RADS 1.  Left adrenal adenoma 4.1 cm  -8/13/2024 iron elevated to 24 saturation elevated 92% total iron binding capacity decreased to 43.  Ferritin 538.  Hemoglobin 15.5     -9/3/2024 [facility]ology consult: Patient very likely has hemochromatosis with this degree of iron saturation.  He had been seen by Dr. Castillo and had apparently blood testing that proved that this was inherited I assume this was an HFE gene mutation but I do not have those results and her office is permanently closed.  He even states that she did a liver biopsy.  Again, I do not have those results.  Nonetheless his laboratories just on the basis of the ferritin and the iron saturation are convincing enough to get him back onto phlebotomies which [facility] starting with weekly phlebotomies and then go to monthly phlebotomies and we will aim for ferritin less than 100 holding phlebotomies for hemoglobin less than 11.  As for the macrocytosis, he may have some liver injury from the hemochromatosis and I will check hepatic ultrasound will check his GGT and pro time.  Liver enzymes normal thus far on CMP.  Will check megaloblastic panel.    -9/3/2024 homozygous C282Y hemochromatosis gene mutation  Erythropoietin level 14.6 normal.  GGT normal at 24.  INR 1.0  Megaloblastic panel normal: B12 normal 612.  Methylmalonic acid normal 204.  Homocystine normal 9.5.   folic acid greater than 20.  Hemolytic panel normal: ReticulocyteThe site count 2.4% normal.  Haptoglobin 196 normal.  VIGNESH negative.  Total direct and indirect  bilirubin all normal.    -9/9/2024 ultrasound liver shows hepatic steatosis without biliary dilation or suspicious focal hepatic lesion.  15 mm right kidney simple cyst.    -10/1/2024 [facility] follow-up: Clearly has hemochromatosis.  No patent liver disease enzymatically or per coagulation studies.  Has fatty liver as well.  No clear-cut explanation for the macrocytosis but he is not anemic and I will not push him further down that road towards bone marrow at this junction.  He has had 3 out of his 4 weekly phlebotomies and then we will go to monthly and I will see him back in 3 months with iron indices and CBC and CMP prior to return.  He is doing this at the [facility] blood [place] holding for hemoglobin less than 11.  Our goal is to get ferritin below 100 without causing anemia.    -1/14/2025 CBC WBC 8.6, hemoglobin 15.7, hematocrit 43.9%, platelet count 214,000.  Ferritin 127.  CMP normal other than for glucose of 134.     Hemochromatosis   4/22/2022 Initial Diagnosis    Hemochromatosis         HISTORY OF PRESENT ILLNESS:  The patient is a 64 y.o. male, here for follow up on management of hereditary hemochromatosis, Mr. Villagomez has been doing phlebotomy about every month although he states he was not able to go last month due to work schedule and the weather.  He states he does feel little better after each phlebotomy.  No change in his health since we saw him last.  Appetite is normal, no change in his bowel or bladder habits.    Past Medical History:   Diagnosis Date    Arthritis     Bilateral carpal tunnel syndrome 05/03/2016    Depression     Generalized erosion of teeth     Hemochromatosis 04/22/2022    History of alcohol abuse     History of carpal tunnel syndrome     History of hepatitis     History of nicotine dependence     History of trigeminal neuralgia     Primary hypertension 04/26/2007    Right trigeminal neuralgia 05/03/2016    Spinal stenosis in cervical region 05/03/2016    Tobacco  "use 10/02/2019     Past Surgical History:   Procedure Laterality Date    ARM WOUND REPAIR / CLOSURE Right     MOUTH SURGERY      Tooth Extraction       Allergies   Allergen Reactions    Amoxicillin Anaphylaxis    Cephalexin Anaphylaxis    Penicillins Anaphylaxis       Family History and Social History reviewed and changed as necessary    REVIEW OF SYSTEM:   No new somatic concerns    PHYSICAL EXAM:  Well-developed, well-nourished appearing male in no distress  No jaundice or icterus  Respirations regular and unlabored, lungs clear to auscultation bilaterally    Vitals:    01/23/25 0826   BP: 144/86   Pulse: 78   Resp: 18   Temp: 98.2 °F (36.8 °C)   SpO2: 96%   Weight: 90.7 kg (200 lb)   Height: 177.8 cm (70\")     Vitals:    01/23/25 0826   PainSc:   6   PainLoc: Knee  Comment: all joints          ECOG score: 0           Vitals reviewed.  Labs reviewed    ECOG: (0) Fully Active - Able to Carry On All Pre-disease Performance Without Restriction    Lab Results   Component Value Date    HGB 15.5 08/06/2024    HCT 47.9 08/06/2024     (H) 08/06/2024     08/06/2024    WBC 10.0 08/06/2024    NEUTROABS 5.4 08/06/2024    LYMPHSABS 3.0 08/06/2024    MONOSABS 1.3 (H) 08/06/2024    EOSABS 0.3 08/06/2024    BASOSABS 0.1 08/06/2024       Lab Results   Component Value Date    GLUCOSE 100 (H) 08/06/2024    BUN 26 08/06/2024    CREATININE 0.87 08/06/2024     08/06/2024    K 4.5 08/06/2024     08/06/2024    CO2 22 08/06/2024    CALCIUM 9.7 08/06/2024    PROTEINTOT 6.8 03/15/2016    ALBUMIN 4.2 08/06/2024    BILITOT 0.3 09/03/2024    ALKPHOS 74 08/06/2024    AST 18 08/06/2024    ALT 22 08/06/2024     -1/14/2025 CBC WBC 8.6, hemoglobin 15.7, hematocrit 43.9%, platelet count 214,000.  Ferritin 127.  CMP normal other than for glucose of 134.        ASSESSMENT & PLAN:    1.  Hemochromatosis  2.  Hypertension  3.  Tobacco abuse    Discussion: On phlebotomy approximately monthly his ferritin has come down nicely, " most recent ferritin 1/14/2025 was 127.  He did not have phlebotomy last month due to weather and work schedule but he is off today and plans on going for phlebotomy today.  Liver enzymes are normal.  He is not anemic, hemoglobin remains normal at 15.7.  He will continue phlebotomy every 4-6 weeks with a goal of ferritin less than 100.  He will continue this at [facility].  We will hold for hemoglobin less than 11.  He does continue to smoke, we discussed smoking cessation today but he is not interested in stopping at this time.  He does do annual screening chest CT with Dr. Borden.  I will see him back in 4 months for follow-up with repeat CBC, CMP, ferritin and iron profile prior to return.    Return to clinic in 4 months for follow-up    I spent 23 minutes caring for Maynor on this date of service. This time includes time spent by me in the following activities: preparing for the visit, reviewing tests, performing a medically appropriate examination and/or evaluation, counseling and educating the patient/family/caregiver, ordering medications, tests, or procedures, documenting information in the medical record, and independently interpreting results and communicating that information with the patient/family/caregiver.     Mary Romeo, APRN    01/23/2025

## 2025-01-31 DIAGNOSIS — I10 PRIMARY HYPERTENSION: ICD-10-CM

## 2025-01-31 DIAGNOSIS — M15.9 OSTEOARTHRITIS OF MULTIPLE JOINTS, UNSPECIFIED OSTEOARTHRITIS TYPE: ICD-10-CM

## 2025-01-31 RX ORDER — MELOXICAM 15 MG/1
15 TABLET ORAL DAILY
Qty: 90 TABLET | Refills: 0 | Status: SHIPPED | OUTPATIENT
Start: 2025-01-31

## 2025-01-31 RX ORDER — AMLODIPINE BESYLATE 5 MG/1
5 TABLET ORAL DAILY
Qty: 30 TABLET | Refills: 0 | Status: SHIPPED | OUTPATIENT
Start: 2025-01-31

## 2025-01-31 RX ORDER — ROSUVASTATIN CALCIUM 10 MG/1
10 TABLET, COATED ORAL DAILY
Qty: 90 TABLET | Refills: 0 | Status: SHIPPED | OUTPATIENT
Start: 2025-01-31

## 2025-01-31 RX ORDER — LISINOPRIL AND HYDROCHLOROTHIAZIDE 12.5; 2 MG/1; MG/1
2 TABLET ORAL DAILY
Qty: 30 TABLET | Refills: 0 | Status: SHIPPED | OUTPATIENT
Start: 2025-01-31

## 2025-02-15 DIAGNOSIS — I10 PRIMARY HYPERTENSION: ICD-10-CM

## 2025-02-17 RX ORDER — LISINOPRIL AND HYDROCHLOROTHIAZIDE 12.5; 2 MG/1; MG/1
2 TABLET ORAL DAILY
Qty: 60 TABLET | Refills: 1 | Status: SHIPPED | OUTPATIENT
Start: 2025-02-17

## 2025-02-17 NOTE — TELEPHONE ENCOUNTER
Rx Refill Note    Requested Prescriptions     Pending Prescriptions Disp Refills    lisinopril-hydrochlorothiazide (PRINZIDE,ZESTORETIC) 20-12.5 MG per tablet [Pharmacy Med Name: Lisinopril-hydroCHLOROthiazide 20-12.5 MG Oral Tablet] 60 tablet 1     Sig: Take 2 tablets by mouth once daily        Last office visit with prescribing clinician: 8/13/2024      Next office visit with prescribing clinician: Visit date not found   Last labs:   Last refill: 01/31/2025   Pharmacy (be sure to add in Epic). correct

## 2025-03-02 DIAGNOSIS — I10 PRIMARY HYPERTENSION: ICD-10-CM

## 2025-03-03 RX ORDER — AMLODIPINE BESYLATE 5 MG/1
5 TABLET ORAL DAILY
Qty: 30 TABLET | Refills: 0 | Status: SHIPPED | OUTPATIENT
Start: 2025-03-03

## 2025-03-03 NOTE — TELEPHONE ENCOUNTER
Rx Refill Note    Requested Prescriptions     Pending Prescriptions Disp Refills    amLODIPine (NORVASC) 5 MG tablet [Pharmacy Med Name: amLODIPine Besylate 5 MG Oral Tablet] 30 tablet 0     Sig: Take 1 tablet by mouth once daily        Last office visit with prescribing clinician: 8/13/2024      Next office visit with prescribing clinician: Visit date not found   Last labs:   Last refill: 03/31/2025   Pharmacy (be sure to add in Epic). correct

## 2025-04-13 DIAGNOSIS — I10 PRIMARY HYPERTENSION: ICD-10-CM

## 2025-04-14 RX ORDER — LISINOPRIL AND HYDROCHLOROTHIAZIDE 12.5; 2 MG/1; MG/1
2 TABLET ORAL DAILY
Qty: 120 TABLET | Refills: 0 | OUTPATIENT
Start: 2025-04-14

## 2025-04-15 ENCOUNTER — TELEPHONE (OUTPATIENT)
Dept: FAMILY MEDICINE CLINIC | Facility: CLINIC | Age: 65
End: 2025-04-15
Payer: COMMERCIAL

## 2025-05-01 DIAGNOSIS — M15.9 OSTEOARTHRITIS OF MULTIPLE JOINTS, UNSPECIFIED OSTEOARTHRITIS TYPE: ICD-10-CM

## 2025-05-01 RX ORDER — ROSUVASTATIN CALCIUM 10 MG/1
10 TABLET, COATED ORAL DAILY
Qty: 30 TABLET | Refills: 0 | Status: SHIPPED | OUTPATIENT
Start: 2025-05-01

## 2025-05-01 RX ORDER — MELOXICAM 15 MG/1
15 TABLET ORAL DAILY
Qty: 30 TABLET | Refills: 0 | Status: SHIPPED | OUTPATIENT
Start: 2025-05-01

## 2025-05-22 NOTE — TELEPHONE ENCOUNTER
Rx Refill Note    Requested Prescriptions     Pending Prescriptions Disp Refills   • lisinopril-hydrochlorothiazide (PRINZIDE,ZESTORETIC) 20-12.5 MG per tablet [Pharmacy Med Name: Lisinopril-hydroCHLOROthiazide 20-12.5 MG Oral Tablet] 90 tablet 0     Sig: Take 1 tablet by mouth once daily   • meloxicam (MOBIC) 15 MG tablet [Pharmacy Med Name: Meloxicam 15 MG Oral Tablet] 90 tablet 0     Sig: Take 1 tablet by mouth once daily        Last office visit with prescribing clinician: 4/22/2022      Next office visit with prescribing clinician: Visit date not found   Last labs:   Last refill: should be good on the lisinopril, should just need refill on meloxicam   Pharmacy (be sure to add in Epic). correct               [de-identified] : s/p Left total knee arthroplasty. 4/3/25. [de-identified] : patient comes in today for wound check. he has been taking keflex for one week he denies drainage from wound, fevers, chlls, nausea, vomiting, etc [de-identified] : left knee exam healing suture abscesses with some scabs. no active drainage  [de-identified] : patient will follow up one week for repeat wound check. complete 10 day course of keflex

## 2025-05-27 ENCOUNTER — TELEPHONE (OUTPATIENT)
Dept: FAMILY MEDICINE CLINIC | Facility: CLINIC | Age: 65
End: 2025-05-27
Payer: COMMERCIAL

## 2025-05-27 DIAGNOSIS — E55.9 VITAMIN D DEFICIENCY: ICD-10-CM

## 2025-05-27 DIAGNOSIS — I10 PRIMARY HYPERTENSION: ICD-10-CM

## 2025-05-27 DIAGNOSIS — Z13.1 SCREENING FOR DIABETES MELLITUS: ICD-10-CM

## 2025-05-27 DIAGNOSIS — E78.2 HYPERLIPIDEMIA, MIXED: Primary | ICD-10-CM

## 2025-05-27 DIAGNOSIS — Z13.29 SCREENING FOR THYROID DISORDER: ICD-10-CM

## 2025-05-27 NOTE — TELEPHONE ENCOUNTER
Incoming Refill Request      Medication requested (name and dose): LISINOPRIL AND AMLODIPINE     Pharmacy where request should be sent: [facility]     Additional details provided by patient: PATIENT CAME IN REQUESTING MEDICATION REFILLS ON BOTH MEDICATIONS.     Best call back number:     Does the patient have less than a 3 day supply:  [x] Yes  [] No    Kirsten Sutherland Rep  05/27/25, 15:48 EDT

## 2025-05-28 RX ORDER — LISINOPRIL AND HYDROCHLOROTHIAZIDE 12.5; 2 MG/1; MG/1
2 TABLET ORAL DAILY
Qty: 30 TABLET | Refills: 0 | Status: SHIPPED | OUTPATIENT
Start: 2025-05-28

## 2025-05-28 RX ORDER — AMLODIPINE BESYLATE 5 MG/1
5 TABLET ORAL DAILY
Qty: 30 TABLET | Refills: 0 | Status: SHIPPED | OUTPATIENT
Start: 2025-05-28

## 2025-05-29 ENCOUNTER — TELEPHONE (OUTPATIENT)
Dept: FAMILY MEDICINE CLINIC | Facility: CLINIC | Age: 65
End: 2025-05-29

## 2025-05-29 NOTE — TELEPHONE ENCOUNTER
Caller: Maynor Villagomez    Relationship to patient: Self    Best call back number: [phone]     Type of visit: MED REFILLS - EARLIEST APPOINTMENT AVAILABLE WAS 6/16/25, BUT PATIENT WILL BE OUT OF MEDICATION BY THEN, PATIENT ONLY HAS 11 DAYS LEFT FOR A FEW MEDICATIONS AND MAY NEED ALL MEDS REFILLED. PATIENT IS REQUESTING FOR A SOONER APPOINTMENT, OR REFILLS ON HIS MEDS TO LAST HIM UNTIL THE APPT    Requested date: ASAP     If rescheduling, when is the original appointment: 6/16/25

## 2025-05-31 DIAGNOSIS — M15.9 OSTEOARTHRITIS OF MULTIPLE JOINTS, UNSPECIFIED OSTEOARTHRITIS TYPE: ICD-10-CM

## 2025-06-02 RX ORDER — MELOXICAM 15 MG/1
15 TABLET ORAL DAILY
Qty: 30 TABLET | Refills: 0 | Status: SHIPPED | OUTPATIENT
Start: 2025-06-02

## 2025-06-02 RX ORDER — ROSUVASTATIN CALCIUM 10 MG/1
10 TABLET, COATED ORAL DAILY
Qty: 30 TABLET | Refills: 0 | Status: SHIPPED | OUTPATIENT
Start: 2025-06-02

## 2025-06-07 DIAGNOSIS — I10 PRIMARY HYPERTENSION: ICD-10-CM

## 2025-06-07 RX ORDER — LISINOPRIL AND HYDROCHLOROTHIAZIDE 12.5; 2 MG/1; MG/1
2 TABLET ORAL DAILY
Qty: 30 TABLET | Refills: 0 | OUTPATIENT
Start: 2025-06-07

## 2025-06-16 ENCOUNTER — OFFICE VISIT (OUTPATIENT)
Dept: FAMILY MEDICINE CLINIC | Facility: CLINIC | Age: 65
End: 2025-06-16
Payer: COMMERCIAL

## 2025-06-16 VITALS
OXYGEN SATURATION: 97 % | RESPIRATION RATE: 18 BRPM | BODY MASS INDEX: 27.77 KG/M2 | TEMPERATURE: 97.9 F | SYSTOLIC BLOOD PRESSURE: 140 MMHG | HEART RATE: 66 BPM | HEIGHT: 70 IN | WEIGHT: 194 LBS | DIASTOLIC BLOOD PRESSURE: 76 MMHG

## 2025-06-16 DIAGNOSIS — Z13.29 SCREENING FOR THYROID DISORDER: ICD-10-CM

## 2025-06-16 DIAGNOSIS — E55.9 VITAMIN D DEFICIENCY: ICD-10-CM

## 2025-06-16 DIAGNOSIS — Z13.1 SCREENING FOR DIABETES MELLITUS (DM): ICD-10-CM

## 2025-06-16 DIAGNOSIS — I10 PRIMARY HYPERTENSION: Primary | ICD-10-CM

## 2025-06-16 DIAGNOSIS — Z13.1 SCREENING FOR DIABETES MELLITUS: ICD-10-CM

## 2025-06-16 DIAGNOSIS — R06.02 SHORTNESS OF BREATH: ICD-10-CM

## 2025-06-16 DIAGNOSIS — J30.89 ENVIRONMENTAL AND SEASONAL ALLERGIES: ICD-10-CM

## 2025-06-16 DIAGNOSIS — E78.2 HYPERLIPIDEMIA, MIXED: ICD-10-CM

## 2025-06-16 PROCEDURE — 99214 OFFICE O/P EST MOD 30 MIN: CPT

## 2025-06-16 RX ORDER — LORATADINE 10 MG/1
10 TABLET ORAL DAILY
Qty: 90 TABLET | Refills: 1 | Status: SHIPPED | OUTPATIENT
Start: 2025-06-16

## 2025-06-16 RX ORDER — CHOLECALCIFEROL (VITAMIN D3) 25 MCG
1000 TABLET ORAL DAILY
COMMUNITY

## 2025-06-16 RX ORDER — LISINOPRIL AND HYDROCHLOROTHIAZIDE 12.5; 2 MG/1; MG/1
2 TABLET ORAL DAILY
Qty: 180 TABLET | Refills: 1 | Status: SHIPPED | OUTPATIENT
Start: 2025-06-16

## 2025-06-16 NOTE — ASSESSMENT & PLAN NOTE
Reports history of vitamin D deficiency  Patient currently taking daily supplementation but is unsure of dose    Orders:    Vitamin D,25-Hydroxy; Future

## 2025-06-16 NOTE — PROGRESS NOTES
Acute Office Visit      Date: 2025  Patient Name: Maynor Villagomez  : 1960   MRN: [id number]     Chief Complaint   Patient presents with    Med Refill       History of Present Illness: Maynor Villagomez is a 64 y.o. male who is here today for medication refills.  He is a patient of Dr. Borden and is here today because she was told to have follow-up visit prior to obtaining medication refills.    HTN:  Pt has been out of his medications    Pulm:  Has used rescue inhaler 4 times in the past 2 weeks    Smoker:  Smoking 1.5 PPD  Plans to quit when he retires in December    Seasonal Allergies:   Sesaonal allergy symptoms    Hx of Hemochromatosis:  Dr. Diop is seeing him but he missed his last appt  Reports he missed appt due to cost of blood testing      Subjective     Constitutional: no fever or night sweats.    HEENT:  no changes in vision or eye pain. no ear discharge, nose bleed or bleeding gums.   CV: no chest pain or palpitations.    Respiratory: no SOB or cough.    GI: no vomiting or diarrhea.   : no difficulty in urination or blood in urine.    MUSC: no muscle or joint pain.   Skin: no rashes or itching.    Neurologic: A&O x 3, no headaches or tremors.   Psych: No SI/HI     Current Outpatient Medications   Medication Instructions    albuterol sulfate  (90 Base) MCG/ACT inhaler 2 puffs, Inhalation, Every 4 Hours PRN    amLODIPine (NORVASC) 5 mg, Oral, Daily    B Complex Vitamins (VITAMIN B COMPLEX PO) Take  by mouth.    cholecalciferol (VITAMIN D3) 1,000 Units, Daily    lisinopril-hydrochlorothiazide (PRINZIDE,ZESTORETIC) 20-12.5 MG per tablet 2 tablets, Oral, Daily    loratadine (CLARITIN) 10 mg, Oral, Daily    meloxicam (MOBIC) 15 mg, Oral, Daily    rosuvastatin (CRESTOR) 10 mg, Oral, Daily       Allergies   Allergen Reactions    Amoxicillin Anaphylaxis    Cephalexin Anaphylaxis    Penicillins Anaphylaxis       Objective     Vitals:    25 0922 25 0927   BP: 152/78  "140/76  Comment: hasn't had medication in 2 days   BP Location: Right arm Right arm   Patient Position: Sitting Sitting   Cuff Size: Large Adult Large Adult   Pulse: 66    Resp: 18    Temp: 97.9 °F (36.6 °C)    TempSrc: Oral    SpO2: 97%    Weight: 88 kg (194 lb)    Height: 177.8 cm (70\")    PainSc: 7     PainLoc: Generalized        Body mass index is 27.84 kg/m².     Physical Exam  Vitals and nursing note reviewed.   Constitutional:       General: He is not in acute distress.  Eyes:      Pupils: Pupils are equal, round, and reactive to light.   Cardiovascular:      Rate and Rhythm: Normal rate and regular rhythm.   Pulmonary:      Effort: Pulmonary effort is normal.      Breath sounds: Normal breath sounds.   Musculoskeletal:         General: Normal range of motion.   Skin:     General: Skin is warm and dry.   Neurological:      Mental Status: He is alert and oriented to person, place, and time.   Psychiatric:         Mood and Affect: Mood normal.         Common labs          8/6/2024    08:14 9/3/2024    08:51   Common Labs   Glucose 100     BUN 26     Creatinine 0.87     Sodium 139     Potassium 4.5     Chloride 103     Calcium 9.7     Albumin 4.2     Total Bilirubin 0.2  0.3    Alkaline Phosphatase 74     AST (SGOT) 18     ALT (SGPT) 22     WBC 10.0     Hemoglobin 15.5     Hematocrit 47.9     Platelets 180     Total Cholesterol 174     Triglycerides 143     HDL Cholesterol 40     LDL Cholesterol  109     Hemoglobin A1C 5.8     PSA 0.5         Assessment / Plan         Primary hypertension  Hypertension stable - elevated today - he reports being out of his medications for a few days  Taking medications as directed   Denies missed doses, denies side effects   Does not report any headaches, blurry vision, dizziness, chest pain, shortness of breath, or palpitations.      Plan:  Continue current medications  Patient agrees to monitor home blood pressures and bring log sheet to follow-up visit with PCP  Report back to " the office if blood pressures consistently >140/90  Pt verbalizes understanding if he develops chest pain, shortness of breath or other alarm symptoms he should call 911 or go to the ER as appropriate. Orders:    lisinopril-hydrochlorothiazide (PRINZIDE,ZESTORETIC) 20-12.5 MG per tablet; Take 2 tablets by mouth Daily.    CBC Auto Differential; Future    Comprehensive Metabolic Panel; Future    Lipid Panel; Future    CK    Environmental and seasonal allergies  Seasonal/environmental allergy self-care:  Take showers in the evening and/or after outdoor activities to remove pollen trapped in head, facial, and body hair, especially before going to bed for the night. (This will help decrease your exposure to residual pollen being deposited in bed linens/pillow case as you sleep)  Take daily seasonal allergy medications and use steroid nasal spray as directed  Increase or maintain daily fluid intake to at least 64 ounces of water per day     Orders:    loratadine (Claritin) 10 MG tablet; Take 1 tablet by mouth Daily.    Vitamin D deficiency  Reports history of vitamin D deficiency  Patient currently taking daily supplementation but is unsure of dose    Orders:    Vitamin D,25-Hydroxy; Future    Screening for diabetes mellitus         Hyperlipidemia, mixed          Summary and Plan:  PCP is Dr. Michi Maldonado for annual wellness visit in August  Blood pressure medications refilled today per patient request  Labs collected today and patient to schedule with PCP for AWV when due       Follow Up:   Return in about 3 months (around 9/16/2025) for Annual physical.    Patient Education:   Reviewed medications, potential side effects and signs and symptoms to report.   Discussed risk vs benefits of treatment plan with patient including medications, labs, and imaging.    Patient education materials attached to AVS and reviewed with patient during office visit today.   Addressed questions and concerns during visit. Patient verbalized  understanding and agrees with tx plan.   Instructed to call the office with any questions and report to ER with any life-threatening symptoms.    [facility]  [address] [id number] [phone]    NOTE TO PATIENT:   The 21st Century Cures Act makes medical notes like these available to patients in the interest of transparency. However, be advised this is a medical document. It is intended as peer to peer communication. It is written in medical language and may contain abbreviations or verbiage that are unfamiliar. It may appear blunt or direct. Medical documents are intended to carry relevant information, facts as evident, and the clinical opinion of the practitioner.     EMR Dragon/Transcription disclaimer:   Much of this encounter note is an electronic transcription of spoken language to printed text. Electronic transcription of spoken language may permit erroneous, or at times, nonsensical words or phrases to be inadvertently transcribed. Although I have reviewed the note for such errors, some may still exist.

## 2025-06-16 NOTE — ASSESSMENT & PLAN NOTE
Hypertension stable - elevated today - he reports being out of his medications for a few days  Taking medications as directed   Denies missed doses, denies side effects   Does not report any headaches, blurry vision, dizziness, chest pain, shortness of breath, or palpitations.      Plan:  Continue current medications  Patient agrees to monitor home blood pressures and bring log sheet to follow-up visit with PCP  Report back to the office if blood pressures consistently >140/90  Pt verbalizes understanding if he develops chest pain, shortness of breath or other alarm symptoms he should call 911 or go to the ER as appropriate. Orders:    lisinopril-hydrochlorothiazide (PRINZIDE,ZESTORETIC) 20-12.5 MG per tablet; Take 2 tablets by mouth Daily.    CBC Auto Differential; Future    Comprehensive Metabolic Panel; Future    Lipid Panel; Future    CK

## 2025-06-17 LAB
CK SERPL-CCNC: 149 U/L (ref 41–331)
TSH SERPL DL<=0.005 MIU/L-ACNC: 1.71 UIU/ML (ref 0.45–4.5)

## 2025-07-02 DIAGNOSIS — M15.9 OSTEOARTHRITIS OF MULTIPLE JOINTS, UNSPECIFIED OSTEOARTHRITIS TYPE: ICD-10-CM

## 2025-07-02 RX ORDER — MELOXICAM 15 MG/1
15 TABLET ORAL DAILY
Qty: 30 TABLET | Refills: 0 | Status: SHIPPED | OUTPATIENT
Start: 2025-07-02

## 2025-07-02 RX ORDER — ROSUVASTATIN CALCIUM 10 MG/1
10 TABLET, COATED ORAL DAILY
Qty: 90 TABLET | Refills: 1 | Status: SHIPPED | OUTPATIENT
Start: 2025-07-02

## 2025-07-02 NOTE — TELEPHONE ENCOUNTER
Rx Refill Note    Requested Prescriptions     Pending Prescriptions Disp Refills    meloxicam (MOBIC) 15 MG tablet [Pharmacy Med Name: Meloxicam 15 MG Oral Tablet] 30 tablet 0     Sig: Take 1 tablet by mouth once daily    rosuvastatin (CRESTOR) 10 MG tablet [Pharmacy Med Name: Rosuvastatin Calcium 10 MG Oral Tablet] 30 tablet 0     Sig: Take 1 tablet by mouth once daily        Last office visit with prescribing clinician: Visit date not found      Next office visit with prescribing clinician: Visit date not found   Last labs:   Last refill: 06/02/2025   Pharmacy (be sure to add in Epic). correct

## 2025-07-18 DIAGNOSIS — I10 PRIMARY HYPERTENSION: ICD-10-CM

## 2025-07-21 RX ORDER — AMLODIPINE BESYLATE 5 MG/1
5 TABLET ORAL DAILY
Qty: 30 TABLET | Refills: 0 | Status: SHIPPED | OUTPATIENT
Start: 2025-07-21

## 2025-07-29 DIAGNOSIS — M15.9 OSTEOARTHRITIS OF MULTIPLE JOINTS, UNSPECIFIED OSTEOARTHRITIS TYPE: ICD-10-CM

## 2025-07-29 RX ORDER — MELOXICAM 15 MG/1
15 TABLET ORAL DAILY
Qty: 30 TABLET | Refills: 0 | Status: SHIPPED | OUTPATIENT
Start: 2025-07-29